# Patient Record
Sex: FEMALE | Race: WHITE | NOT HISPANIC OR LATINO | URBAN - METROPOLITAN AREA
[De-identification: names, ages, dates, MRNs, and addresses within clinical notes are randomized per-mention and may not be internally consistent; named-entity substitution may affect disease eponyms.]

---

## 2017-03-28 ENCOUNTER — RECORDS - HEALTHEAST (OUTPATIENT)
Dept: LAB | Facility: CLINIC | Age: 27
End: 2017-03-28

## 2017-03-28 LAB
HIV 1+2 AB+HIV1 P24 AG SERPL QL IA: NEGATIVE
HIV 1+2 AB+HIV1 P24 AG SERPL QL IA: NORMAL

## 2017-03-29 LAB
HBV SURFACE AG SERPL QL IA: NEGATIVE
HBV SURFACE AG SERPL QL IA: NORMAL
RUBELLA ANTIBODY IGG QUANTITATIVE: NORMAL IU/ML
SYPHILIS RPR SCREEN - HISTORICAL: NORMAL

## 2017-04-03 LAB
C TRACH DNA SPEC QL PROBE+SIG AMP: NORMAL
N GONORRHOEA DNA SPEC QL PROBE+SIG AMP: NORMAL

## 2017-04-05 ENCOUNTER — RECORDS - HEALTHEAST (OUTPATIENT)
Dept: ADMINISTRATIVE | Facility: OTHER | Age: 27
End: 2017-04-05

## 2017-04-05 LAB
BKR LAB AP ABNORMAL BLEEDING: NO
BKR LAB AP BIRTH CONTROL/HORMONES: NORMAL
BKR LAB AP CERVICAL APPEARANCE: NORMAL
BKR LAB AP GYN ADEQUACY: NORMAL
BKR LAB AP GYN INTERPRETATION: NORMAL
BKR LAB AP HPV REFLEX: NORMAL
BKR LAB AP LMP: NORMAL
BKR LAB AP PATIENT STATUS: NORMAL
BKR LAB AP PREVIOUS ABNORMAL: NORMAL
BKR LAB AP PREVIOUS NORMAL: NORMAL
GLU GEST SCREEN 1HR 50G: 97
HIGH RISK?: NO
PATH REPORT.COMMENTS IMP SPEC: NORMAL
RESULT FLAG (HE HISTORICAL CONVERSION): NORMAL

## 2017-07-20 ENCOUNTER — AMBULATORY - HEALTHEAST (OUTPATIENT)
Dept: SCHEDULING | Facility: CLINIC | Age: 27
End: 2017-07-20

## 2017-07-20 ENCOUNTER — RECORDS - HEALTHEAST (OUTPATIENT)
Dept: LAB | Facility: CLINIC | Age: 27
End: 2017-07-20

## 2017-07-20 DIAGNOSIS — O36.5990 IUGR (INTRAUTERINE GROWTH RESTRICTION) AFFECTING CARE OF MOTHER: ICD-10-CM

## 2017-07-20 LAB — GLU GEST SCREEN 1HR 50G: 114

## 2017-07-21 ENCOUNTER — AMBULATORY - HEALTHEAST (OUTPATIENT)
Dept: SCHEDULING | Facility: CLINIC | Age: 27
End: 2017-07-21

## 2017-07-21 DIAGNOSIS — O36.5990 IUGR (INTRAUTERINE GROWTH RESTRICTION) AFFECTING CARE OF MOTHER: ICD-10-CM

## 2017-07-21 LAB
SYPHILIS RPR SCREEN - HISTORICAL: NORMAL
T PALLIDUM IGG SER QL: NON REACTIVE

## 2017-07-25 ENCOUNTER — HOSPITAL ENCOUNTER (OUTPATIENT)
Dept: MEDSURG UNIT | Facility: CLINIC | Age: 27
Discharge: HOME | End: 2017-07-25
Attending: FAMILY MEDICINE | Admitting: FAMILY MEDICINE

## 2017-07-25 ENCOUNTER — HOSPITAL ENCOUNTER (OUTPATIENT)
Dept: ULTRASOUND IMAGING | Facility: CLINIC | Age: 27
Discharge: HOME | End: 2017-07-25
Attending: FAMILY MEDICINE

## 2017-07-25 DIAGNOSIS — O36.5990 IUGR (INTRAUTERINE GROWTH RESTRICTION) AFFECTING CARE OF MOTHER: ICD-10-CM

## 2017-07-28 ENCOUNTER — HOSPITAL ENCOUNTER (OUTPATIENT)
Dept: OBGYN | Facility: HOSPITAL | Age: 27
Discharge: HOME | End: 2017-07-28
Attending: OBSTETRICS & GYNECOLOGY | Admitting: FAMILY MEDICINE

## 2017-08-18 ENCOUNTER — HOSPITAL ENCOUNTER (OUTPATIENT)
Dept: OBGYN | Facility: HOSPITAL | Age: 27
Discharge: HOME | End: 2017-08-19
Attending: OBSTETRICS & GYNECOLOGY | Admitting: FAMILY MEDICINE

## 2017-08-18 ASSESSMENT — MIFFLIN-ST. JEOR: SCORE: 1493.79

## 2017-08-22 ENCOUNTER — HOSPITAL ENCOUNTER (OUTPATIENT)
Dept: ULTRASOUND IMAGING | Facility: CLINIC | Age: 27
End: 2017-08-22
Attending: OBSTETRICS & GYNECOLOGY
Payer: COMMERCIAL

## 2017-08-22 ENCOUNTER — HOSPITAL ENCOUNTER (INPATIENT)
Facility: CLINIC | Age: 27
LOS: 3 days | Discharge: HOME HEALTH | End: 2017-08-26
Attending: OBSTETRICS & GYNECOLOGY | Admitting: OBSTETRICS & GYNECOLOGY
Payer: COMMERCIAL

## 2017-08-22 DIAGNOSIS — Z98.891 S/P CESAREAN SECTION: Primary | ICD-10-CM

## 2017-08-22 PROBLEM — O36.5990 IUGR (INTRAUTERINE GROWTH RESTRICTION) AFFECTING CARE OF MOTHER: Status: ACTIVE | Noted: 2017-08-22

## 2017-08-22 PROBLEM — Z36.89 ENCOUNTER FOR TRIAGE IN PREGNANT PATIENT: Status: ACTIVE | Noted: 2017-08-22

## 2017-08-22 LAB
ABO + RH BLD: NORMAL
ABO + RH BLD: NORMAL
BLD GP AB SCN SERPL QL: NORMAL
BLOOD BANK CMNT PATIENT-IMP: NORMAL
ERYTHROCYTE [DISTWIDTH] IN BLOOD BY AUTOMATED COUNT: 13.7 % (ref 10–15)
HCT VFR BLD AUTO: 35.3 % (ref 35–47)
HGB BLD-MCNC: 11.9 G/DL (ref 11.7–15.7)
MCH RBC QN AUTO: 30.9 PG (ref 26.5–33)
MCHC RBC AUTO-ENTMCNC: 33.7 G/DL (ref 31.5–36.5)
MCV RBC AUTO: 92 FL (ref 78–100)
PLATELET # BLD AUTO: 219 10E9/L (ref 150–450)
RBC # BLD AUTO: 3.85 10E12/L (ref 3.8–5.2)
SPECIMEN EXP DATE BLD: NORMAL
WBC # BLD AUTO: 9.4 10E9/L (ref 4–11)

## 2017-08-22 PROCEDURE — 76811 OB US DETAILED SNGL FETUS: CPT

## 2017-08-22 PROCEDURE — 96361 HYDRATE IV INFUSION ADD-ON: CPT

## 2017-08-22 PROCEDURE — G0378 HOSPITAL OBSERVATION PER HR: HCPCS

## 2017-08-22 PROCEDURE — 86780 TREPONEMA PALLIDUM: CPT | Performed by: OBSTETRICS & GYNECOLOGY

## 2017-08-22 PROCEDURE — 86900 BLOOD TYPING SEROLOGIC ABO: CPT | Performed by: OBSTETRICS & GYNECOLOGY

## 2017-08-22 PROCEDURE — 99214 OFFICE O/P EST MOD 30 MIN: CPT | Mod: 25

## 2017-08-22 PROCEDURE — 76819 FETAL BIOPHYS PROFIL W/O NST: CPT | Performed by: OBSTETRICS & GYNECOLOGY

## 2017-08-22 PROCEDURE — 85027 COMPLETE CBC AUTOMATED: CPT | Performed by: OBSTETRICS & GYNECOLOGY

## 2017-08-22 PROCEDURE — 25000128 H RX IP 250 OP 636: Performed by: OBSTETRICS & GYNECOLOGY

## 2017-08-22 PROCEDURE — 96372 THER/PROPH/DIAG INJ SC/IM: CPT

## 2017-08-22 PROCEDURE — 86850 RBC ANTIBODY SCREEN: CPT | Performed by: OBSTETRICS & GYNECOLOGY

## 2017-08-22 PROCEDURE — 25000132 ZZH RX MED GY IP 250 OP 250 PS 637: Performed by: OBSTETRICS & GYNECOLOGY

## 2017-08-22 PROCEDURE — 96366 THER/PROPH/DIAG IV INF ADDON: CPT

## 2017-08-22 PROCEDURE — 36415 COLL VENOUS BLD VENIPUNCTURE: CPT | Performed by: OBSTETRICS & GYNECOLOGY

## 2017-08-22 PROCEDURE — 96360 HYDRATION IV INFUSION INIT: CPT

## 2017-08-22 PROCEDURE — 86901 BLOOD TYPING SEROLOGIC RH(D): CPT | Performed by: OBSTETRICS & GYNECOLOGY

## 2017-08-22 PROCEDURE — 96365 THER/PROPH/DIAG IV INF INIT: CPT

## 2017-08-22 RX ORDER — OLANZAPINE 5 MG/1
7.5 TABLET ORAL
Status: ON HOLD | COMMUNITY
End: 2017-08-26

## 2017-08-22 RX ORDER — CYANOCOBALAMIN 1000 UG/ML
1 INJECTION, SOLUTION INTRAMUSCULAR; SUBCUTANEOUS
COMMUNITY

## 2017-08-22 RX ORDER — BETAMETHASONE SODIUM PHOSPHATE AND BETAMETHASONE ACETATE 3; 3 MG/ML; MG/ML
12 INJECTION, SUSPENSION INTRA-ARTICULAR; INTRALESIONAL; INTRAMUSCULAR; SOFT TISSUE EVERY 24 HOURS
Status: DISCONTINUED | OUTPATIENT
Start: 2017-08-22 | End: 2017-08-23

## 2017-08-22 RX ORDER — SODIUM CHLORIDE, SODIUM LACTATE, POTASSIUM CHLORIDE, CALCIUM CHLORIDE 600; 310; 30; 20 MG/100ML; MG/100ML; MG/100ML; MG/100ML
INJECTION, SOLUTION INTRAVENOUS CONTINUOUS
Status: DISCONTINUED | OUTPATIENT
Start: 2017-08-22 | End: 2017-08-23

## 2017-08-22 RX ORDER — PRENATAL VIT/IRON FUM/FOLIC AC 27MG-0.8MG
1 TABLET ORAL DAILY
COMMUNITY

## 2017-08-22 RX ORDER — OLANZAPINE 7.5 MG/1
7.5 TABLET, FILM COATED ORAL AT BEDTIME
Status: DISCONTINUED | OUTPATIENT
Start: 2017-08-22 | End: 2017-08-25

## 2017-08-22 RX ORDER — HYDROXYZINE HYDROCHLORIDE 25 MG/1
25 TABLET, FILM COATED ORAL AT BEDTIME
Status: DISCONTINUED | OUTPATIENT
Start: 2017-08-22 | End: 2017-08-23

## 2017-08-22 RX ORDER — LIDOCAINE 40 MG/G
CREAM TOPICAL
Status: DISCONTINUED | OUTPATIENT
Start: 2017-08-22 | End: 2017-08-23

## 2017-08-22 RX ORDER — HYDROXYZINE HCL 10 MG/5 ML
25 SOLUTION, ORAL ORAL DAILY
Status: ON HOLD | COMMUNITY
End: 2017-08-26

## 2017-08-22 RX ADMIN — SODIUM CHLORIDE, POTASSIUM CHLORIDE, SODIUM LACTATE AND CALCIUM CHLORIDE: 600; 310; 30; 20 INJECTION, SOLUTION INTRAVENOUS at 17:05

## 2017-08-22 RX ADMIN — HYDROXYZINE HYDROCHLORIDE 25 MG: 25 TABLET ORAL at 22:11

## 2017-08-22 RX ADMIN — BETAMETHASONE SODIUM PHOSPHATE AND BETAMETHASONE ACETATE 12 MG: 3; 3 INJECTION, SUSPENSION INTRA-ARTICULAR; INTRALESIONAL; INTRAMUSCULAR at 19:52

## 2017-08-22 RX ADMIN — SODIUM CHLORIDE, POTASSIUM CHLORIDE, SODIUM LACTATE AND CALCIUM CHLORIDE 1000 ML: 600; 310; 30; 20 INJECTION, SOLUTION INTRAVENOUS at 16:09

## 2017-08-22 RX ADMIN — OLANZAPINE 7.5 MG: 7.5 TABLET, FILM COATED ORAL at 22:11

## 2017-08-22 NOTE — IP AVS SNAPSHOT
MRN:[id number]                      After Visit Summary   2017    Jazz Lal    MRN: [id number]           Thank you!     Thank you for choosing [facility] for your care. Our goal is always to provide you with excellent care. Hearing back from our patients is one way we can continue to improve our services. Please take a few minutes to complete the written survey that you may receive in the mail after you visit with us. Thank you!        Patient Information     Date Of Birth          1990        Designated Caregiver       Most Recent Value    Caregiver    Will someone help with your care after discharge? no      About your hospital stay     You were admitted on:  2017 You last received care in the:  [facility]    You were discharged on:  2017       Who to Call     For medical emergencies, please call 911.  For non-urgent questions about your medical care, please call your primary care provider or clinic, [phone]  For questions related to your surgery, please call your surgery clinic        Attending Provider     Provider Laura Au,  OB/Gyn       Primary Care Provider Office Phone # Fax #    Jazz Maddox [phone]      After Care Instructions     Activity       Review discharge instructions            Diet       Resume previous diet            Discharge Instructions - Gestational diabetic patients       Gestational diabetic patients to follow up for fasting blood sugar and 2 hour 75gm glucose load at 6 weeks postpartum.            Discharge Instructions - Postpartum visit       Schedule postpartum visit with your provider and return to clinic in 6 weeks and in 1 week for mood check.      Activity Instructions:   -  delivery: No lifting more than 15 pounds for 6 weeks.  Nothing in the vagina for 6 weeks, no intercourse for 6 weeks. No strenuous exercise for 6 weeks. No driving until you have stopped taking your pain  medications.      Call your health care provider if you have any of the following: Fever above 100.4 F; opening or drainage from your incision; soaking a sanitary pad with blood within 1 hour, or you see blood clots larger than a golf ball; malodorous vaginal discharge, severe or worsening pain uncontrolled by your pain medications, nausea and vomiting, severe headaches, changes in vision, calf swelling or pain, shortness of breath, problems coping with sadness, anxiety, or depression.  If you have any concerns about hurting yourself or the baby, call your provider immediately. You are encouraged to call with questions or concerns after you return home.                  Further instructions from your care team       Postop  Birth Instructions    Activity       Do not lift more than 10 pounds for 6 weeks after surgery.  Ask family and friends for help when you need it.    No driving until you have stopped taking your pain medications (usually two weeks after surgery).    No heavy exercise or activity for 6 weeks.  Don't do anything that will put a strain on your surgery site.    Don't strain when using the toilet.  Your care team may prescribe a stool softener if you have problems with your bowel movements.     To care for your incision:       Keep the incision clean and dry.    Do not soak your incision in water. No swimming or hot tubs until it has fully healed. You may soak in the bathtub if the water level is below your incision.    Do not use peroxide, gel, cream, lotion, or ointment on your incision.    Adjust your clothes to avoid pressure on your surgery site (check the elastic in your underwear for example).     You may see a small amount of clear or pink drainage and this is normal.  Check with your health care provider:       If the drainage increases or has an odor.    If the incision reddens, you have swelling, or develop a rash.    If you have increased pain and the medicine we prescribed  doesn't help.    If you have a fever above 100.4 F (38 C) with or without chills when placing thermometer under your tongue.   The area around your incision (surgery wound), will feel numb.  This is normal. The numbness should go away in less than a year.     Keep your hands clean:  Always wash your hands before touching your incision (surgery wound). This helps reduce your risk of infection. If your hands aren't dirty, you may use an alcohol hand-rub to clean your hands. Keep your nails clean and short.    Call your healthcare provider if you have any of these symptoms:       You soak a sanitary pad with blood within 1 hour, or you see blood clots larger than a golf ball.    Bleeding that lasts more than 6 weeks.    Vaginal discharge that smells bad.    Severe pain, cramping or tenderness in your lower belly area.    A need to urinate more frequently (use the toilet more often), more urgently (use the toilet very quickly), or it burns when you urinate.    Nausea and vomiting.    Redness, swelling or pain around a vein in your leg.    Problems breastfeeding or a red or painful area on your breast.    Chest pain and cough or are gasping for air.    Problems with coping with sadness, anxiety or depression. If you have concerns about hurting yourself or the baby, call your provider immediately.      You have questions or concerns after you return home.                  Pending Results     Date and Time Order Name Status Description    8/24/2017 0939 Toxoplasma Preg Panel >16 wk gest In process             Statement of Approval     Ordered          08/26/17 0200  I have reviewed and agree with all the recommendations and orders detailed in this document.  EFFECTIVE NOW     Approved and electronically signed by:  Laura Samano DO             Admission Information     Date & Time Provider Department Dept. Phone    8/22/2017 Laura Samano DO [facility] [phone]      Your Vitals Were     Blood Pressure  "Temperature Respirations Height Weight Pulse Oximetry    123/71 97.8  F (36.6  C) (Oral) 16 1.626 m (5' 4\") 80.3 kg (177 lb) 97%    BMI (Body Mass Index)                   30.38 kg/m2           [employer] Information     [employer] lets you send messages to your doctor, view your test results, renew your prescriptions, schedule appointments and more. To sign up, go to [email or web address] . Click on \"Log in\" on the left side of the screen, which will take you to the Welcome page. Then click on \"Sign up Now\" on the right side of the page.     You will be asked to enter the access code listed below, as well as some personal information. Please follow the directions to create your username and password.     Your access code is: [email or web address]  Expires: 2017 12:04 PM     Your access code will  in 90 days. If you need help or a new code, please call your [facility] clinic or [phone].        Care EveryWhere ID     This is your Care EveryWhere ID. This could be used by other organizations to access your [facility] medical records  [id number]        Equal Access to Services     SIRI EISENBERG AH: Terrence Dias, wawalterda brenden, qaybta kaalmada adekallieda, lupe valencia. So [facility] [phone].    ATENCIÓN: Si habla español, tiene a underwood disposición servicios gratuitos de asistencia lingüística. Llame al [phone].    We comply with applicable federal civil rights laws and [place] laws. We do not discriminate on the basis of race, color, national origin, age, disability sex, sexual orientation or gender identity.               Review of your medicines      START taking        Dose / Directions    divalproex 500 MG 24 hr tablet   Commonly known as:  DEPAKOTE ER        Dose:  500 mg   Take 1 tablet (500 mg) by mouth At Bedtime   Quantity:  30 tablet   Refills:  1       ibuprofen 600 MG tablet   Commonly known as:  ADVIL/MOTRIN        Dose:  600 mg   Take 1 tablet (600 mg) by " mouth every 6 hours as needed for other (cramping)   Quantity:  30 tablet   Refills:  0       oxyCODONE-acetaminophen 5-325 MG per tablet   Commonly known as:  PERCOCET        Dose:  1-2 tablet   Take 1-2 tablets by mouth every 4 hours as needed for pain maximum 8 tablet(s) per day   Quantity:  30 tablet   Refills:  0       senna-docusate 8.6-50 MG per tablet   Commonly known as:  SENOKOT-S;PERICOLACE        Dose:  1-2 tablet   Take 1-2 tablets by mouth 2 times daily as needed for constipation   Quantity:  60 tablet   Refills:  1         CONTINUE these medicines which may have CHANGED, or have new prescriptions. If we are uncertain of the size of tablets/capsules you have at home, strength may be listed as something that might have changed.        Dose / Directions    OLANZapine 10 MG tablet   Commonly known as:  zyPREXA   This may have changed:    - medication strength  - how much to take  - when to take this        Dose:  10 mg   Take 1 tablet (10 mg) by mouth At Bedtime   Quantity:  30 tablet   Refills:  1         CONTINUE these medicines which have NOT CHANGED        Dose / Directions    cyanocobalamin 1000 MCG/ML injection   Commonly known as:  VITAMIN B12        Dose:  1 mL   Inject 1 mL into the muscle every 30 days   Refills:  0       prenatal multivitamin plus iron 27-0.8 MG Tabs per tablet        Dose:  1 tablet   Take 1 tablet by mouth daily   Refills:  0         STOP taking     hydrOXYzine 10 MG/5ML syrup   Commonly known as:  ATARAX           ZOFRAN PO                Where to get your medicines      These medications were sent to [facility] [place], [place] - [address]  [address] [address]     Phone:  [phone]     divalproex 500 MG 24 hr tablet    ibuprofen 600 MG tablet    OLANZapine 10 MG tablet    senna-docusate 8.6-50 MG per tablet         Some of these will need a paper prescription and others can be bought over the counter. Ask your nurse if you  have questions.     Bring a paper prescription for each of these medications     oxyCODONE-acetaminophen 5-325 MG per tablet                Protect others around you: Learn how to safely use, store and throw away your medicines at www.disposemymeds.org.             Medication List: This is a list of all your medications and when to take them. Check marks below indicate your daily home schedule. Keep this list as a reference.      Medications           Morning Afternoon Evening Bedtime As Needed    cyanocobalamin 1000 MCG/ML injection   Commonly known as:  VITAMIN B12   Inject 1 mL into the muscle every 30 days                                divalproex 500 MG 24 hr tablet   Commonly known as:  DEPAKOTE ER   Take 1 tablet (500 mg) by mouth At Bedtime   Last time this was given:  500 mg on 8/25/2017 11:41 PM                                ibuprofen 600 MG tablet   Commonly known as:  ADVIL/MOTRIN   Take 1 tablet (600 mg) by mouth every 6 hours as needed for other (cramping)   Last time this was given:  800 mg on 8/26/2017  4:47 AM                                OLANZapine 10 MG tablet   Commonly known as:  zyPREXA   Take 1 tablet (10 mg) by mouth At Bedtime   Last time this was given:  10 mg on 8/25/2017 11:41 PM                                oxyCODONE-acetaminophen 5-325 MG per tablet   Commonly known as:  PERCOCET   Take 1-2 tablets by mouth every 4 hours as needed for pain maximum 8 tablet(s) per day                                prenatal multivitamin plus iron 27-0.8 MG Tabs per tablet   Take 1 tablet by mouth daily                                senna-docusate 8.6-50 MG per tablet   Commonly known as:  SENOKOT-S;PERICOLACE   Take 1-2 tablets by mouth 2 times daily as needed for constipation   Last time this was given:  2 tablets on 8/26/2017  8:58 AM

## 2017-08-22 NOTE — H&P
[facility]  OB History and Physical      Jazz Lal MRN# [id number]   Age: 26 year old YOB: 1990     CC: Abnormal heart on outside ultrasound    HPI:  Ms. Jazz Lal is a 26 year old  at 35w2d by 6w3d US, who is presents via ambulance from outside clinic with concerns for an abnormal heart noted during her BPP.  Her pregnancy has been complicated by symmetric fetal growth restriction and an absent left kidney noted at her L2 ultrasound with MPP.  They recommended serial growth ultrasounds and BPPs/UARs which she has been receiving with [facility].  On review of her records she had multiple evaluations at [place] for non-reactive NSTs/mimial variability/intermittent decelerations and received a course of betamethasone x 2 3 weeks ago.  UARs and BPPs have been normal except for on episode of SD elevation on 17.  Has not had any genetic screening or testing performed.      She states that she has been feeling well without contractions, vaginal bleeding, loss of fluid and reports good fetal movement.  She has been doing nightly kick counts all of which have been normal.      Pregnancy Complications:  - Non-reactive NST, today and previously.   - Absent/atrophic left kidney - absent on earlier US  - Symmetrical IUGR - see US below  - History of bipolar disorder - currently on Zyprexa. Was on Depakote for 1st month of pregnancy which she discontinued. Patient's parents have custody rights to her first child.     Outside ultrasounds  17: 25w2d US, EFW 663g, 8%tile, IUGR, nl JAYCE, nl UA waveform  17: 30w2d US, EFW 1197g, <3%ile, severe symmetric IUGR,  absent L kidney, nl JAYCE, nl UA waveform  17:  Estimated Fetal Weight: 1480 +/- 216 g, EFW Percentile: 28%, UAR SD ratio slightly elevated given gestational age.  3.6-4.5 (normal <4.0 for gestational age).     17: Total biophysical profile . Umbilical Arterial Doppler: 3.3. JAYCE 7.2.   17  Total biophysical profile . The kidneys appear asymmetric with the left kidney appearing smaller than the right. Cannot exclude left renal atrophy. No hydronephrosis is seen. JAYCE 8.82.     OB History: , previous  at 40w GA, 7lbs 2oz  Obstetric History       T1      L1     SAB0   TAB0   Ectopic0   Multiple0   Live Births1       # Outcome Date GA Lbr [place]/2nd Weight Sex Delivery Anes PTL Lv   2 Current            1 Term 10/05/14 41w0d  3.232 kg (7 lb 2 oz) F   N IDALIA      Obstetric Comments   [facility].  Parents have custody.       PMHx: Bipolar disorder  Past Medical History:   Diagnosis Date     Mental disorder     Bipolar disorder- currently taking zyprexa- will take depakote after delivery     PSHx: [place] teeth extraction    Meds:   Prescriptions Prior to Admission   Medication Sig Dispense Refill Last Dose     OLANZapine (ZYPREXA) 5 MG tablet Take 7.5 mg by mouth   2017 at Unknown time     hydrOXYzine (ATARAX) 10 MG/5ML syrup Take 25 mg by mouth daily   2017 at Unknown time     Ondansetron HCl (ZOFRAN PO) Take by mouth as needed for nausea or vomiting   2017 at Unknown time     Prenatal Vit-Fe Fumarate-FA (PRENATAL MULTIVITAMIN PLUS IRON) 27-0.8 MG TABS per tablet Take 1 tablet by mouth daily   2017 at Unknown time     cyanocobalamin (VITAMIN B12) 1000 MCG/ML injection Inject 1 mL into the muscle every 30 days   2017 at Unknown time     Allergies:    Allergies   Allergen Reactions     Bees Anaphylaxis and Hives      FmHx: Noncontributory  SocHx: She denies any tobacco, alcohol, or other drug use during this pregnancy.    ROS:   Complete 10-point ROS negative except as noted in HPI. She denies headache, blurry vision, chest pain, shortness of breath, RUQ pain, nausea, vomiting, dysuria, hematuria or extremity edema.    PE:  Vit:   Patient Vitals for the past 4 hrs:   BP   17 0756 110/77      Gen: Well-appearing, NAD, comfortable   CV: rrr, no mrg  "  Pulm: Ctab, no wheezes or crackles   Abd: Soft, gravid, non-tender, +BS   Ext: LE edema b/l    FHT: Baseline 140, minimal variability, absent accelerations, absent decelerations   [place]: No contractions in 10 minutes        Please see \"Imaging\" tab under \"Chart Review\" for details of today's US.    17 US (on admission):   1) Intrauterine pregnancy at 35+2 weeks gestational age.  2) Concerns for possible hypoplastic left heart as LV<RV, narrowed LVOT, significant tricuspid regurgitation, pericardial effusion. [place] kidney. None of the other anomalies commonly  detected by ultrasound were evident in the limited fetal anatomic survey described in full report.  3) Growth parameters and estimated fetal weight were consistent with fetal growth restriction with AC at the 3%tile, HC at the 1%tile, not consistent with microcepahly. EFW 10%tile.  4) The amniotic fluid volume appeared normal.  5) Normal umbilical artery doppler.  6) Reassuring BPP.    Assessment  Ms. Jazz Lal is a 26 year old  at 35w2d by 6w3d US with fetal growth restriction and new finding of congenital heart disease.    #) Fetal Well Being: ultrasound concerning for possible hypoplastic left heart or right sided lesion and fetal growth restriction.     US significant for narrowed LVOT, tricuspid regurg, and pericardial effusion. No polyhydramnios or fetal edema. Growth restriction with AC at the 3%tile, HC at the 1%tile. EFW 10%tile.   Discussed with patient possible etiologies for current presentation, including FGR secondary to placental insufficiency, associated with cardiac anomalies, or possibly chromosomal abnormalities.  We discussed our concerns for underlying genetic causes given the early onset of symmetrical growth restriction.  Options, risks and benefits of NIPT screening, amniocentesis and cord bloods for karyotype/CGH, TORCH titers (given FGR, pericardial effusion) were discussed.  We will have her meet with our genetic " counselor in am.  We discussed this information would be important to determine  prognosis and options/candidacy for surgery.             - FHT with minimal to moderate variability and non-reactivity. This is consistent with previous NSTs she has had during pregnancy and may be secondary to underlying genetic or cardiac abnormalities as BPPs have always been reassuring.  - Will continue EFM and [place] monitoring overnight with repeat BPP in am.  Delivery would be indicated for prolonged or repetitive decelerations.      - Rescue course of betamethasone to be given this evening.    - Will arrange NICU and social work consultation in am after fetal ECHO.  - Consented for  delivery    #) Prenatal care  - AB+/Ab neg, RI/RPR neg/Hep B neg/HIV neg  - GCT: 114  - GBS: unknown    #) Bipolar disorder  - Continue home Zyprexa  - UDS    I, Consuelo Reagan, MS4 am serving as a scribe to document services personally performed by Laura Samano DO, based upon my observations and the provider's statements to me. All documentation has been reviewed by the aforementioned doctor prior to being entered into the official medical record.     Consuelo Reagan  OB/GYN MS4  17 4:43 PM       Late entry:  Attending Attestation:  The documentation recorded by the scribe accurately reflects the services I personally performed and the decisions made by me.  We discussed that we will continue to monitor her overnight with plans for fetal ECHO in am.  Given baseline non-reactive fetal strip throughout pregnancy this is unlikely secondary to fetal hypoxia and related to underlying fetal cardiac disease/genetic abnormalities, particularly in the setting of reassuring BPPs and delivery is not indicated at this time, unless repetitive or prolonged decelerations are seen.  Will give rescue course of betamethasone during this admission.           I spent 45 minutes total face-to-face with this inpatient, and >50% of the time was spent  in counseling and/or coordination of care.    Laura Samano, DO  Maternal Fetal Medicine

## 2017-08-22 NOTE — PROVIDER NOTIFICATION
08/22/17 1540   Provider Notification   Provider Name/Title Dr Florez   Method of Notification In Department   Notification Reason Status Update   Pt arrived via ambulance, placed on EFM monitor. Reporting history to Med student. Dr Florez ordered IV started and bolus of LR. Shortly after IV placed and bolus started pt brought to ultrasound at 1610.

## 2017-08-22 NOTE — IP AVS SNAPSHOT
[facility]    [address] [facility] [address] [facility] [address]    Phone:  [phone]                                       After Visit Summary   8/22/2017    Jazz Lal    MRN: [id number]           After Visit Summary Signature Page     I have received my discharge instructions, and my questions have been answered. I have discussed any challenges I see with this plan with the nurse or doctor.    ..........................................................................................................................................  Patient/Patient Representative Signature      ..........................................................................................................................................  Patient Representative Print Name and Relationship to Patient    ..................................................               ................................................  Date                                            Time    ..........................................................................................................................................  Reviewed by Signature/Title    ...................................................              ..............................................  Date                                                            Time

## 2017-08-23 ENCOUNTER — SURGERY (OUTPATIENT)
Age: 27
End: 2017-08-23

## 2017-08-23 ENCOUNTER — APPOINTMENT (OUTPATIENT)
Dept: CARDIOLOGY | Facility: CLINIC | Age: 27
End: 2017-08-23
Attending: OBSTETRICS & GYNECOLOGY
Payer: COMMERCIAL

## 2017-08-23 ENCOUNTER — PRE VISIT (OUTPATIENT)
Dept: MATERNAL FETAL MEDICINE | Facility: CLINIC | Age: 27
End: 2017-08-23

## 2017-08-23 ENCOUNTER — HOSPITAL ENCOUNTER (OUTPATIENT)
Dept: ULTRASOUND IMAGING | Facility: CLINIC | Age: 27
Setting detail: OBSERVATION
End: 2017-08-23
Attending: OBSTETRICS & GYNECOLOGY
Payer: COMMERCIAL

## 2017-08-23 ENCOUNTER — ANESTHESIA EVENT (OUTPATIENT)
Dept: OBGYN | Facility: CLINIC | Age: 27
End: 2017-08-23
Payer: COMMERCIAL

## 2017-08-23 ENCOUNTER — ANESTHESIA (OUTPATIENT)
Dept: OBGYN | Facility: CLINIC | Age: 27
End: 2017-08-23
Payer: COMMERCIAL

## 2017-08-23 PROBLEM — Z98.891 S/P CESAREAN SECTION: Status: ACTIVE | Noted: 2017-08-23

## 2017-08-23 PROBLEM — O36.8390 NON-REASSURING FETAL HEART RATE OR RHYTHM AFFECTING MANAGEMENT OF MOTHER: Status: ACTIVE | Noted: 2017-08-23

## 2017-08-23 LAB
AMPHETAMINES UR QL SCN>1000 NG/ML: NEGATIVE
BARBITURATES UR QL SCN: NEGATIVE
BENZODIAZ UR QL SCN: NEGATIVE
BZE UR QL SCN>300 NG/ML: NEGATIVE
CARBOXYTHC UR QL SCN: NEGATIVE
CREAT UR-MCNC: 18 MG/DL
ETHANOL UR QL: NEGATIVE
METHADONE UR QL SCN: NEGATIVE
OPIATES UR QL SCN: NEGATIVE
OXYCODONE UR QL SCN: NEGATIVE
PCP CTO UR SCN-MCNC: NEGATIVE NG/ML
T PALLIDUM IGG+IGM SER QL: NEGATIVE

## 2017-08-23 PROCEDURE — 96361 HYDRATE IV INFUSION ADD-ON: CPT

## 2017-08-23 PROCEDURE — 27110028 ZZH OR GENERAL SUPPLY NON-STERILE: Performed by: OBSTETRICS & GYNECOLOGY

## 2017-08-23 PROCEDURE — 36000059 ZZH SURGERY LEVEL 3 EA 15 ADDTL MIN UMMC: Performed by: OBSTETRICS & GYNECOLOGY

## 2017-08-23 PROCEDURE — 36000057 ZZH SURGERY LEVEL 3 1ST 30 MIN - UMMC: Performed by: OBSTETRICS & GYNECOLOGY

## 2017-08-23 PROCEDURE — 25000128 H RX IP 250 OP 636: Performed by: NURSE ANESTHETIST, CERTIFIED REGISTERED

## 2017-08-23 PROCEDURE — 25000128 H RX IP 250 OP 636: Performed by: OBSTETRICS & GYNECOLOGY

## 2017-08-23 PROCEDURE — 25000132 ZZH RX MED GY IP 250 OP 250 PS 637: Performed by: OBSTETRICS & GYNECOLOGY

## 2017-08-23 PROCEDURE — 76819 FETAL BIOPHYS PROFIL W/O NST: CPT | Performed by: OBSTETRICS & GYNECOLOGY

## 2017-08-23 PROCEDURE — G0378 HOSPITAL OBSERVATION PER HR: HCPCS

## 2017-08-23 PROCEDURE — C9290 INJ, BUPIVACAINE LIPOSOME: HCPCS | Performed by: STUDENT IN AN ORGANIZED HEALTH CARE EDUCATION/TRAINING PROGRAM

## 2017-08-23 PROCEDURE — 25000125 ZZHC RX 250: Performed by: OBSTETRICS & GYNECOLOGY

## 2017-08-23 PROCEDURE — 12000030 ZZH R&B OB INTERMEDIATE UMMC

## 2017-08-23 PROCEDURE — 71000014 ZZH RECOVERY PHASE 1 LEVEL 2 FIRST HR: Performed by: OBSTETRICS & GYNECOLOGY

## 2017-08-23 PROCEDURE — 25000125 ZZHC RX 250: Performed by: STUDENT IN AN ORGANIZED HEALTH CARE EDUCATION/TRAINING PROGRAM

## 2017-08-23 PROCEDURE — 88307 TISSUE EXAM BY PATHOLOGIST: CPT | Performed by: OBSTETRICS & GYNECOLOGY

## 2017-08-23 PROCEDURE — 27210794 ZZH OR GENERAL SUPPLY STERILE: Performed by: OBSTETRICS & GYNECOLOGY

## 2017-08-23 PROCEDURE — 76825 ECHO EXAM OF FETAL HEART: CPT

## 2017-08-23 PROCEDURE — 80307 DRUG TEST PRSMV CHEM ANLYZR: CPT | Performed by: OBSTETRICS & GYNECOLOGY

## 2017-08-23 PROCEDURE — 25000128 H RX IP 250 OP 636: Performed by: STUDENT IN AN ORGANIZED HEALTH CARE EDUCATION/TRAINING PROGRAM

## 2017-08-23 PROCEDURE — 71000015 ZZH RECOVERY PHASE 1 LEVEL 2 EA ADDTL HR: Performed by: OBSTETRICS & GYNECOLOGY

## 2017-08-23 PROCEDURE — 25000132 ZZH RX MED GY IP 250 OP 250 PS 637

## 2017-08-23 PROCEDURE — 40000170 ZZH STATISTIC PRE-PROCEDURE ASSESSMENT II: Performed by: OBSTETRICS & GYNECOLOGY

## 2017-08-23 PROCEDURE — 76816 OB US FOLLOW-UP PER FETUS: CPT

## 2017-08-23 PROCEDURE — 37000009 ZZH ANESTHESIA TECHNICAL FEE, EACH ADDTL 15 MIN: Performed by: OBSTETRICS & GYNECOLOGY

## 2017-08-23 PROCEDURE — 88307 TISSUE EXAM BY PATHOLOGIST: CPT | Mod: 26 | Performed by: OBSTETRICS & GYNECOLOGY

## 2017-08-23 PROCEDURE — 25000125 ZZHC RX 250: Performed by: NURSE ANESTHETIST, CERTIFIED REGISTERED

## 2017-08-23 PROCEDURE — 37000008 ZZH ANESTHESIA TECHNICAL FEE, 1ST 30 MIN: Performed by: OBSTETRICS & GYNECOLOGY

## 2017-08-23 RX ORDER — LIDOCAINE 40 MG/G
CREAM TOPICAL
Status: DISCONTINUED | OUTPATIENT
Start: 2017-08-23 | End: 2017-08-26 | Stop reason: HOSPADM

## 2017-08-23 RX ORDER — KETOROLAC TROMETHAMINE 30 MG/ML
INJECTION, SOLUTION INTRAMUSCULAR; INTRAVENOUS PRN
Status: DISCONTINUED | OUTPATIENT
Start: 2017-08-23 | End: 2017-08-23

## 2017-08-23 RX ORDER — SODIUM CHLORIDE, SODIUM LACTATE, POTASSIUM CHLORIDE, CALCIUM CHLORIDE 600; 310; 30; 20 MG/100ML; MG/100ML; MG/100ML; MG/100ML
INJECTION, SOLUTION INTRAVENOUS CONTINUOUS
Status: DISCONTINUED | OUTPATIENT
Start: 2017-08-23 | End: 2017-08-23 | Stop reason: HOSPADM

## 2017-08-23 RX ORDER — HYDROCORTISONE 2.5 %
CREAM (GRAM) TOPICAL 3 TIMES DAILY PRN
Status: DISCONTINUED | OUTPATIENT
Start: 2017-08-23 | End: 2017-08-26 | Stop reason: HOSPADM

## 2017-08-23 RX ORDER — CEFAZOLIN SODIUM 2 G/100ML
2 INJECTION, SOLUTION INTRAVENOUS
Status: DISCONTINUED | OUTPATIENT
Start: 2017-08-23 | End: 2017-08-23 | Stop reason: HOSPADM

## 2017-08-23 RX ORDER — IBUPROFEN 400 MG/1
400-800 TABLET, FILM COATED ORAL EVERY 6 HOURS PRN
Status: DISCONTINUED | OUTPATIENT
Start: 2017-08-23 | End: 2017-08-26 | Stop reason: HOSPADM

## 2017-08-23 RX ORDER — FLUMAZENIL 0.1 MG/ML
0.2 INJECTION, SOLUTION INTRAVENOUS
Status: DISCONTINUED | OUTPATIENT
Start: 2017-08-23 | End: 2017-08-23 | Stop reason: HOSPADM

## 2017-08-23 RX ORDER — OXYTOCIN/0.9 % SODIUM CHLORIDE 30/500 ML
100 PLASTIC BAG, INJECTION (ML) INTRAVENOUS CONTINUOUS
Status: DISCONTINUED | OUTPATIENT
Start: 2017-08-23 | End: 2017-08-26 | Stop reason: HOSPADM

## 2017-08-23 RX ORDER — OXYTOCIN 10 [USP'U]/ML
10 INJECTION, SOLUTION INTRAMUSCULAR; INTRAVENOUS
Status: DISCONTINUED | OUTPATIENT
Start: 2017-08-23 | End: 2017-08-26 | Stop reason: HOSPADM

## 2017-08-23 RX ORDER — METHYLERGONOVINE MALEATE 0.2 MG/ML
200 INJECTION INTRAVENOUS
Status: DISCONTINUED | OUTPATIENT
Start: 2017-08-23 | End: 2017-08-26 | Stop reason: HOSPADM

## 2017-08-23 RX ORDER — AMOXICILLIN 250 MG
1-2 CAPSULE ORAL 2 TIMES DAILY
Status: DISCONTINUED | OUTPATIENT
Start: 2017-08-23 | End: 2017-08-26 | Stop reason: HOSPADM

## 2017-08-23 RX ORDER — MORPHINE SULFATE 1 MG/ML
INJECTION, SOLUTION EPIDURAL; INTRATHECAL; INTRAVENOUS PRN
Status: DISCONTINUED | OUTPATIENT
Start: 2017-08-23 | End: 2017-08-23

## 2017-08-23 RX ORDER — OXYTOCIN/0.9 % SODIUM CHLORIDE 30/500 ML
PLASTIC BAG, INJECTION (ML) INTRAVENOUS CONTINUOUS PRN
Status: DISCONTINUED | OUTPATIENT
Start: 2017-08-23 | End: 2017-08-23

## 2017-08-23 RX ORDER — DIPHENHYDRAMINE HCL 25 MG
25 CAPSULE ORAL EVERY 6 HOURS PRN
Status: DISCONTINUED | OUTPATIENT
Start: 2017-08-23 | End: 2017-08-26 | Stop reason: HOSPADM

## 2017-08-23 RX ORDER — KETOROLAC TROMETHAMINE 30 MG/ML
30 INJECTION, SOLUTION INTRAMUSCULAR; INTRAVENOUS EVERY 6 HOURS
Status: DISCONTINUED | OUTPATIENT
Start: 2017-08-23 | End: 2017-08-24

## 2017-08-23 RX ORDER — SODIUM CHLORIDE, SODIUM LACTATE, POTASSIUM CHLORIDE, CALCIUM CHLORIDE 600; 310; 30; 20 MG/100ML; MG/100ML; MG/100ML; MG/100ML
INJECTION, SOLUTION INTRAVENOUS CONTINUOUS
Status: DISCONTINUED | OUTPATIENT
Start: 2017-08-23 | End: 2017-08-23

## 2017-08-23 RX ORDER — ONDANSETRON 2 MG/ML
4 INJECTION INTRAMUSCULAR; INTRAVENOUS EVERY 30 MIN PRN
Status: DISCONTINUED | OUTPATIENT
Start: 2017-08-23 | End: 2017-08-24 | Stop reason: HOSPADM

## 2017-08-23 RX ORDER — FENTANYL CITRATE 50 UG/ML
25-50 INJECTION, SOLUTION INTRAMUSCULAR; INTRAVENOUS
Status: DISCONTINUED | OUTPATIENT
Start: 2017-08-23 | End: 2017-08-23 | Stop reason: HOSPADM

## 2017-08-23 RX ORDER — ONDANSETRON 2 MG/ML
INJECTION INTRAMUSCULAR; INTRAVENOUS PRN
Status: DISCONTINUED | OUTPATIENT
Start: 2017-08-23 | End: 2017-08-23

## 2017-08-23 RX ORDER — CEFAZOLIN SODIUM 1 G/3ML
1 INJECTION, POWDER, FOR SOLUTION INTRAMUSCULAR; INTRAVENOUS
Status: DISCONTINUED | OUTPATIENT
Start: 2017-08-23 | End: 2017-08-23 | Stop reason: HOSPADM

## 2017-08-23 RX ORDER — ONDANSETRON 2 MG/ML
4 INJECTION INTRAMUSCULAR; INTRAVENOUS EVERY 6 HOURS PRN
Status: DISCONTINUED | OUTPATIENT
Start: 2017-08-23 | End: 2017-08-26 | Stop reason: HOSPADM

## 2017-08-23 RX ORDER — BUPIVACAINE HYDROCHLORIDE AND EPINEPHRINE 2.5; 5 MG/ML; UG/ML
INJECTION, SOLUTION INFILTRATION; PERINEURAL PRN
Status: DISCONTINUED | OUTPATIENT
Start: 2017-08-23 | End: 2017-08-23

## 2017-08-23 RX ORDER — DEXTROSE, SODIUM CHLORIDE, SODIUM LACTATE, POTASSIUM CHLORIDE, AND CALCIUM CHLORIDE 5; .6; .31; .03; .02 G/100ML; G/100ML; G/100ML; G/100ML; G/100ML
INJECTION, SOLUTION INTRAVENOUS CONTINUOUS
Status: DISCONTINUED | OUTPATIENT
Start: 2017-08-23 | End: 2017-08-26 | Stop reason: HOSPADM

## 2017-08-23 RX ORDER — OXYCODONE HYDROCHLORIDE 5 MG/1
5-10 TABLET ORAL
Status: DISCONTINUED | OUTPATIENT
Start: 2017-08-23 | End: 2017-08-26 | Stop reason: HOSPADM

## 2017-08-23 RX ORDER — HYDROMORPHONE HYDROCHLORIDE 1 MG/ML
.3-.5 INJECTION, SOLUTION INTRAMUSCULAR; INTRAVENOUS; SUBCUTANEOUS EVERY 5 MIN PRN
Status: DISCONTINUED | OUTPATIENT
Start: 2017-08-23 | End: 2017-08-24 | Stop reason: HOSPADM

## 2017-08-23 RX ORDER — ACETAMINOPHEN 325 MG/1
650 TABLET ORAL EVERY 4 HOURS PRN
Status: DISCONTINUED | OUTPATIENT
Start: 2017-08-26 | End: 2017-08-26 | Stop reason: HOSPADM

## 2017-08-23 RX ORDER — CITRIC ACID/SODIUM CITRATE 334-500MG
30 SOLUTION, ORAL ORAL
Status: COMPLETED | OUTPATIENT
Start: 2017-08-23 | End: 2017-08-23

## 2017-08-23 RX ORDER — CEFAZOLIN SODIUM 1 G/3ML
INJECTION, POWDER, FOR SOLUTION INTRAMUSCULAR; INTRAVENOUS PRN
Status: DISCONTINUED | OUTPATIENT
Start: 2017-08-23 | End: 2017-08-23

## 2017-08-23 RX ORDER — FENTANYL CITRATE 50 UG/ML
25-50 INJECTION, SOLUTION INTRAMUSCULAR; INTRAVENOUS
Status: DISCONTINUED | OUTPATIENT
Start: 2017-08-23 | End: 2017-08-24 | Stop reason: HOSPADM

## 2017-08-23 RX ORDER — SIMETHICONE 80 MG
80 TABLET,CHEWABLE ORAL 4 TIMES DAILY PRN
Status: DISCONTINUED | OUTPATIENT
Start: 2017-08-23 | End: 2017-08-26 | Stop reason: HOSPADM

## 2017-08-23 RX ORDER — CARBOPROST TROMETHAMINE 250 UG/ML
250 INJECTION, SOLUTION INTRAMUSCULAR
Status: DISCONTINUED | OUTPATIENT
Start: 2017-08-23 | End: 2017-08-26 | Stop reason: HOSPADM

## 2017-08-23 RX ORDER — ACETAMINOPHEN 325 MG/1
975 TABLET ORAL EVERY 8 HOURS
Status: DISCONTINUED | OUTPATIENT
Start: 2017-08-23 | End: 2017-08-26 | Stop reason: HOSPADM

## 2017-08-23 RX ORDER — OXYTOCIN/0.9 % SODIUM CHLORIDE 30/500 ML
340 PLASTIC BAG, INJECTION (ML) INTRAVENOUS CONTINUOUS PRN
Status: DISCONTINUED | OUTPATIENT
Start: 2017-08-23 | End: 2017-08-26 | Stop reason: HOSPADM

## 2017-08-23 RX ORDER — DIPHENHYDRAMINE HYDROCHLORIDE 50 MG/ML
25 INJECTION INTRAMUSCULAR; INTRAVENOUS EVERY 6 HOURS PRN
Status: DISCONTINUED | OUTPATIENT
Start: 2017-08-23 | End: 2017-08-26 | Stop reason: HOSPADM

## 2017-08-23 RX ORDER — LANOLIN 100 %
OINTMENT (GRAM) TOPICAL
Status: DISCONTINUED | OUTPATIENT
Start: 2017-08-23 | End: 2017-08-26 | Stop reason: HOSPADM

## 2017-08-23 RX ORDER — NALOXONE HYDROCHLORIDE 0.4 MG/ML
.1-.4 INJECTION, SOLUTION INTRAMUSCULAR; INTRAVENOUS; SUBCUTANEOUS
Status: DISCONTINUED | OUTPATIENT
Start: 2017-08-23 | End: 2017-08-23 | Stop reason: HOSPADM

## 2017-08-23 RX ORDER — ONDANSETRON 4 MG/1
4 TABLET, ORALLY DISINTEGRATING ORAL EVERY 30 MIN PRN
Status: DISCONTINUED | OUTPATIENT
Start: 2017-08-23 | End: 2017-08-24 | Stop reason: HOSPADM

## 2017-08-23 RX ORDER — NALOXONE HYDROCHLORIDE 0.4 MG/ML
.1-.4 INJECTION, SOLUTION INTRAMUSCULAR; INTRAVENOUS; SUBCUTANEOUS
Status: DISCONTINUED | OUTPATIENT
Start: 2017-08-23 | End: 2017-08-26 | Stop reason: HOSPADM

## 2017-08-23 RX ORDER — BISACODYL 10 MG
10 SUPPOSITORY, RECTAL RECTAL DAILY PRN
Status: DISCONTINUED | OUTPATIENT
Start: 2017-08-25 | End: 2017-08-26 | Stop reason: HOSPADM

## 2017-08-23 RX ORDER — SODIUM CHLORIDE, SODIUM LACTATE, POTASSIUM CHLORIDE, CALCIUM CHLORIDE 600; 310; 30; 20 MG/100ML; MG/100ML; MG/100ML; MG/100ML
INJECTION, SOLUTION INTRAVENOUS CONTINUOUS
Status: DISCONTINUED | OUTPATIENT
Start: 2017-08-23 | End: 2017-08-24 | Stop reason: HOSPADM

## 2017-08-23 RX ORDER — CITRIC ACID/SODIUM CITRATE 334-500MG
SOLUTION, ORAL ORAL
Status: COMPLETED
Start: 2017-08-23 | End: 2017-08-23

## 2017-08-23 RX ORDER — BUPIVACAINE HYDROCHLORIDE 7.5 MG/ML
INJECTION, SOLUTION INTRASPINAL PRN
Status: DISCONTINUED | OUTPATIENT
Start: 2017-08-23 | End: 2017-08-23

## 2017-08-23 RX ORDER — MISOPROSTOL 200 UG/1
400 TABLET ORAL
Status: DISCONTINUED | OUTPATIENT
Start: 2017-08-23 | End: 2017-08-26 | Stop reason: HOSPADM

## 2017-08-23 RX ADMIN — OLANZAPINE 7.5 MG: 7.5 TABLET, FILM COATED ORAL at 22:14

## 2017-08-23 RX ADMIN — OXYTOCIN-SODIUM CHLORIDE 0.9% IV SOLN 30 UNIT/500ML 100 ML/HR: 30-0.9/5 SOLUTION at 16:00

## 2017-08-23 RX ADMIN — SODIUM CHLORIDE, POTASSIUM CHLORIDE, SODIUM LACTATE AND CALCIUM CHLORIDE: 600; 310; 30; 20 INJECTION, SOLUTION INTRAVENOUS at 14:15

## 2017-08-23 RX ADMIN — ACETAMINOPHEN 975 MG: 325 TABLET, FILM COATED ORAL at 19:29

## 2017-08-23 RX ADMIN — KETOROLAC TROMETHAMINE 30 MG: 30 INJECTION, SOLUTION INTRAMUSCULAR at 15:23

## 2017-08-23 RX ADMIN — SODIUM CHLORIDE, POTASSIUM CHLORIDE, SODIUM LACTATE AND CALCIUM CHLORIDE: 600; 310; 30; 20 INJECTION, SOLUTION INTRAVENOUS at 14:18

## 2017-08-23 RX ADMIN — PHENYLEPHRINE HYDROCHLORIDE 100 MCG: 10 INJECTION, SOLUTION INTRAMUSCULAR; INTRAVENOUS; SUBCUTANEOUS at 14:55

## 2017-08-23 RX ADMIN — SODIUM CITRATE AND CITRIC ACID MONOHYDRATE 30 ML: 500; 334 SOLUTION ORAL at 14:09

## 2017-08-23 RX ADMIN — BUPIVACAINE 20 ML: 13.3 INJECTION, SUSPENSION, LIPOSOMAL INFILTRATION at 17:00

## 2017-08-23 RX ADMIN — ONDANSETRON 4 MG: 2 INJECTION INTRAMUSCULAR; INTRAVENOUS at 14:55

## 2017-08-23 RX ADMIN — BUPIVACAINE HYDROCHLORIDE AND EPINEPHRINE BITARTRATE 20 ML: 2.5; .005 INJECTION, SOLUTION INFILTRATION; PERINEURAL at 17:00

## 2017-08-23 RX ADMIN — SENNOSIDES AND DOCUSATE SODIUM 1 TABLET: 8.6; 5 TABLET ORAL at 22:14

## 2017-08-23 RX ADMIN — CEFAZOLIN 2 G: 1 INJECTION, POWDER, FOR SOLUTION INTRAMUSCULAR; INTRAVENOUS at 14:33

## 2017-08-23 RX ADMIN — SODIUM CHLORIDE, POTASSIUM CHLORIDE, SODIUM LACTATE AND CALCIUM CHLORIDE: 600; 310; 30; 20 INJECTION, SOLUTION INTRAVENOUS at 00:53

## 2017-08-23 RX ADMIN — PHENYLEPHRINE HYDROCHLORIDE 0.4 MCG/KG/MIN: 10 INJECTION, SOLUTION INTRAMUSCULAR; INTRAVENOUS; SUBCUTANEOUS at 14:30

## 2017-08-23 RX ADMIN — Medication 30 ML: at 14:09

## 2017-08-23 RX ADMIN — OXYTOCIN-SODIUM CHLORIDE 0.9% IV SOLN 30 UNIT/500ML 300 ML/HR: 30-0.9/5 SOLUTION at 14:55

## 2017-08-23 RX ADMIN — KETOROLAC TROMETHAMINE 30 MG: 30 INJECTION, SOLUTION INTRAMUSCULAR at 22:14

## 2017-08-23 RX ADMIN — Medication 0.2 MG: at 14:30

## 2017-08-23 RX ADMIN — BUPIVACAINE HYDROCHLORIDE IN DEXTROSE 1.6 ML: 7.5 INJECTION, SOLUTION SUBARACHNOID at 14:30

## 2017-08-23 RX ADMIN — SODIUM CHLORIDE, POTASSIUM CHLORIDE, SODIUM LACTATE AND CALCIUM CHLORIDE 1000 ML: 600; 310; 30; 20 INJECTION, SOLUTION INTRAVENOUS at 13:04

## 2017-08-23 RX ADMIN — SODIUM CHLORIDE, SODIUM LACTATE, POTASSIUM CHLORIDE, CALCIUM CHLORIDE AND DEXTROSE MONOHYDRATE: 5; 600; 310; 30; 20 INJECTION, SOLUTION INTRAVENOUS at 18:33

## 2017-08-23 NOTE — PLAN OF CARE
Problem: Goal Outcome Summary  Goal: Goal Outcome Summary  Outcome: No Change  Pt fully prepped for primary C/S at 1410. S/p NICU consult. Pt's mother will be support person in OR. Pt anxious but states understanding of reason for urgent surgical delivery.

## 2017-08-23 NOTE — ANESTHESIA POSTPROCEDURE EVALUATION
Patient: Jazz Lal    Procedure(s):   - Wound Class: II-Clean Contaminated    Diagnosis:pregnancy   Diagnosis Additional Information: No value filed.    Anesthesia Type:  Spinal, Periph. Nerve Block for postop pain    Note:  Anesthesia Post Evaluation    Patient location during evaluation: OB PACU  Patient participation: Able to participate in evaluation but full recovery from regional anesthesia has not yet ocurrred but is anticipated to occur within 48 hours  Level of consciousness: awake and alert  Pain management: adequate  Airway patency: patent  Cardiovascular status: acceptable  Respiratory status: acceptable  Hydration status: acceptable  PONV: none     Anesthetic complications: None          Last vitals:  Vitals:    08/23/17 1655 08/23/17 1700 08/23/17 1710   BP: 107/67 112/62 104/69   Resp: 17 18 24   Temp:      SpO2: 99% 100% 100%         Electronically Signed By: Galen Barrios MD  August 23, 2017  5:17 PM

## 2017-08-23 NOTE — DISCHARGE SUMMARY
[facility] Discharge Summary    Jazz Lal MRN# [id number]   Age: 26 year old YOB: 1990     Date of Admission:  2017  Date of Discharge::  2017   Admitting Physician:  Laura Samano, DO  Discharge Physician:  Dagoberto Borjas          Admission Diagnoses:   -  at 35w2d  - BPP, nonreactive NST  - Symmetric IUGR  - History of bipolar disorder          Discharge Diagnosis:   -  s/p delivery as noted below  - Infant with multiple anomalies - symmetric IUGR, fetal horseshoe kidney, premature closure of ductus arteriosus resulting in right heart failure  - History of bipolar disorder and postpartum psychosis          Procedures:   Procedure(s): BPP  Fetal Echocardiogram  Continuous EFM  Rescue BMZ x1   Primary low transverse  section  Spinal          Medications Prior to Admission:     Prescriptions Prior to Admission   Medication Sig Dispense Refill Last Dose     OLANZapine (ZYPREXA) 5 MG tablet Take 7.5 mg by mouth   2017 at Unknown time     hydrOXYzine (ATARAX) 10 MG/5ML syrup Take 25 mg by mouth daily   2017 at Unknown time     Ondansetron HCl (ZOFRAN PO) Take by mouth as needed for nausea or vomiting   2017 at Unknown time     Prenatal Vit-Fe Fumarate-FA (PRENATAL MULTIVITAMIN PLUS IRON) 27-0.8 MG TABS per tablet Take 1 tablet by mouth daily   2017 at Unknown time     cyanocobalamin (VITAMIN B12) 1000 MCG/ML injection Inject 1 mL into the muscle every 30 days   2017 at Unknown time             Discharge Medications:        Review of your medicines      START taking       Dose / Directions    divalproex 500 MG 24 hr tablet   Commonly known as:  DEPAKOTE ER   Used for:  S/P  section        Dose:  500 mg   Take 1 tablet (500 mg) by mouth At Bedtime   Quantity:  30 tablet   Refills:  1       ibuprofen 600 MG tablet   Commonly known as:  ADVIL/MOTRIN   Used for:  S/P  section        Dose:  600 mg   Take 1 tablet (600 mg)  by mouth every 6 hours as needed for other (cramping)   Quantity:  30 tablet   Refills:  0       oxyCODONE-acetaminophen 5-325 MG per tablet   Commonly known as:  PERCOCET   Used for:  S/P  section        Dose:  1-2 tablet   Take 1-2 tablets by mouth every 4 hours as needed for pain maximum 8 tablet(s) per day   Quantity:  30 tablet   Refills:  0       senna-docusate 8.6-50 MG per tablet   Commonly known as:  SENOKOT-S;PERICOLACE   Used for:  S/P  section        Dose:  1-2 tablet   Take 1-2 tablets by mouth 2 times daily as needed for constipation   Quantity:  60 tablet   Refills:  1         CONTINUE these medicines which may have CHANGED, or have new prescriptions. If we are uncertain of the size of tablets/capsules you have at home, strength may be listed as something that might have changed.       Dose / Directions    OLANZapine 10 MG tablet   Commonly known as:  zyPREXA   This may have changed:    - medication strength  - how much to take  - when to take this   Used for:  S/P  section        Dose:  10 mg   Take 1 tablet (10 mg) by mouth At Bedtime   Quantity:  30 tablet   Refills:  1         CONTINUE these medicines which have NOT CHANGED       Dose / Directions    cyanocobalamin 1000 MCG/ML injection   Commonly known as:  VITAMIN B12        Dose:  1 mL   Inject 1 mL into the muscle every 30 days   Refills:  0       prenatal multivitamin plus iron 27-0.8 MG Tabs per tablet        Dose:  1 tablet   Take 1 tablet by mouth daily   Refills:  0         STOP taking          hydrOXYzine 10 MG/5ML syrup   Commonly known as:  ATARAX           ZOFRAN PO                Where to get your medicines      These medications were sent to [facility] [place], [place] - [address]  [address] [address]     Phone:  [phone]      divalproex 500 MG 24 hr tablet     ibuprofen 600 MG tablet     OLANZapine 10 MG tablet     senna-docusate 8.6-50 MG per tablet          Some of these will need a paper prescription and others can be bought over the counter. Ask your nurse if you have questions.     Bring a paper prescription for each of these medications      oxyCODONE-acetaminophen 5-325 MG per tablet                   Consultations:     Neonatology, Pediatric Cardiology, Social Work, Psychiatry          Brief Admission History     Ms. Jazz Lal is a 26 year old  at 35w2d by 6w3d US, who is presents via ambulance from outside clinic. She was diagnosed with IUGR on L2 US obtained at 25w2d for h/o use of bipolar medications. Since then she has been followed with twice weekly BPP/NST. BPPs have been reassuring. NSTs have been non-reactive throughout. She also had slightly elevated UA doppler on 17. She received BMZx2 three weeks ago. She was admitted to OSH for observation at 34w6d with non-reassuring NST, she was sent home after reassuring BPP was obtained. Today's NST showed minimal variability and multiple decels. There was also reportedly fluid collection around fetal heart and abnormality in heart contraction. BPP was 8/8. She denies contractions, vaginal bleeding, and loss of fluid.   + normal fetal movement. She does nightly kick counts, all of which have been normal. No headache or visual changes. No fevers or chills. No chest pain or shortness of breath. No edema. She has had persistent nausea throughout pregnancy, for which she takes Zofran. No vomiting.            Hospital Course:     She was admitted and underwent US evaluation which demonstrated a horseshoe kidney, pericardial effusion, concern for hypoplastic left heart. Her tracing at that time was concerning for large periods of minimal variability and she received continuous fetal monitoring. She received a rescue course of betamethasone and was consented for a  section for fetal indications. She underwent repeat BPP on HD#2 which was 8/8 and her fetal heart tracing remained Category II d/t  minimal variability and intermittent decels/variables. She received a fetal echocardiogram which demonstrated premature closure of the ductus arteriosus resulting in right heart failure/hypertrophy. Multi-disciplinary phone conference with pediatric cardiology and decision made to proceed with delivery given concerns for progressing heart failure. Multi-disciplinary phone conference with pediatric cardiology regarding newly diagnosed condition, resulted in decision made to proceed with delivery given concerns for progressing heart failure. Due to high concern that this fetus would not tolerate labor and due to inability to adequately monitor and extrapolate changes in fetal heart tracing d/t its continuous Category II FHT, recommendations were made to proceed with  section.The risks, benefits, and alternatives of  section were discussed with the patient, and she agreed to proceed.      She underwent primary low transverse  section on 17. Her surgery was overall uncomplicated. EBL 900cc.     Intra-operative findings:  1. Clear amniotic fluid  2. Liveborn female infant in cephalic presentation. Apgars 8 at 1 minute & 8 at 5 minutes. Weight 2040 g.  3. Arterial cord gases not collected. Venous cord pH 7.36, base deficit 2.5.  4. Normal uterus with two small subserosal fibroids (1-2 cm), fallopian tubes, and ovaries.             Postpartum Course:     Her postpartum course was uncomplicated. Given her history of bipolar disorder she was seen by social work and psychiatry during her admission. She was continued on her PTA zyprexa and restarted on Depakote 500 mg QHS per her outside psychiatrist with intent to slowly increase dose. On PPD#3, she was meeting all of her postpartum goals and deemed stable for discharge. She was voiding without difficulty, tolerating a regular diet without nausea and vomiting, her pain was well controlled on oral pain medicines and her lochia was appropriate. Her  hemoglobin prior to delivery was 11.9 and after delivery was 10.5. Her Rh status was positive, and Rhogam was not indicated.              Discharge Instructions and Follow-Up:   Discharge diet: Regular   Discharge activity: No driving while on narcotic medications  No lifting >8-10 Ibs for 4-6 weeks  No intercourse until 6 weeks postpartum   Discharge follow-up: Follow up with PCP in 1 week for a mood check and 6 weeks for a PPV  [facility] Soonest available appointment is November 3 at 8:25 am. She was placed on cancellation list, with hope of getting her appointment moved up within the next week.            Discharge Disposition:   Discharged to home      Luma Florez MD  OB/GYN Resident, PGY-3  8/27/2017 10:22 AM           Physician Attestation   I, Dagoberto Borjas, saw this patient with the resident and agree with the resident s findings and plan of care as documented in the resident s note.      I personally reviewed vital signs, medications and labs.    Key findings: postpartum care    Dagoberto Borjas  Date of Service (when I saw the patient): 8/26/17    Time Spent on this Encounter   I, Dagoberto Borjas, spent a total of 15 minutes bedside and on the inpatient unit today managing the care of Jazz Lal.  Over 50% of my time on the unit was spent counseling the patient and /or coordinating care regarding postcesarean delivery care. See note for details.

## 2017-08-23 NOTE — PLAN OF CARE
Problem: Individualization  Goal: Patient Preferences  Outcome: Declining  FHT's continue with minimal to moderate variability, no accelerations, variable decels, and 2 prolonged decels overnight.  Dr Marshall aware and reviewed strip.  Pt denies complaints.  Plan to continue monitoring and await fetal echo results in AM.  Pt and significant other had verbal argument overnight and he left.  Pt would like him to be allowed back if he comes and feels he is just overwhelmed with the situation.  She called her mother to come be with her.  Pt is also requesting that providers share medical information with her mother and father.

## 2017-08-23 NOTE — ANESTHESIA CARE TRANSFER NOTE
Patient: Jazz Lal    Procedure(s):   - Wound Class: II-Clean Contaminated    Diagnosis: pregnancy   Diagnosis Additional Information: No value filed.    Anesthesia Type:   Spinal, Periph. Nerve Block for postop pain     Note:  Airway :Room Air  Patient transferred to:Labor and Delivery  Comments: Pt to LD recovery.  VSS.  Report to RN, questions answered.       Vitals: (Last set prior to Anesthesia Care Transfer)    CRNA VITALS  8/23/2017 1504 - 8/23/2017 1540      8/23/2017             NIBP: 100/60    Pulse: 84    NIBP Mean: 80    SpO2: 97 %                Electronically Signed By: MADIHA Christensen CRNA  August 23, 2017  3:40 PM

## 2017-08-23 NOTE — PLAN OF CARE
Problem: Individualization  Goal: Patient Preferences  Outcome: No Change  FHT's continue with minimal to moderate variability and occasional variable decels, no changes.  Pt denies complaints, reports being happy that they found a second kidney on US today- does not seem to be processing infants cardiac issues.  Continue monitoring.

## 2017-08-23 NOTE — OP NOTE
[facility]  Full Operative Progress Note     Surgery Date: 2017     Surgeon: Laura Samano DO     Assistants: Luma Florez MD, PGY-3     Pre-op Diagnosis:                     - Intrauterine pregnancy at 35w3d  - Premature closure of ductus arteriosus resulting in right ventricular hypertrophy/decreased contractility, significant tricuspid regurgitation and impending heart failure of fetus  - Symmetric fetal growth restriction  - Horseshoe kidney  - Bipolar disorder  - Obesity, BMI 30     Post-op Diagnosis:                   - Same   - Liveborn female infant      Procedure: Primary low-transverse  section with double layer uterine closure via Pfannenstiel incision    Anesthesia: Spinal    EBL:  900 mL    IVF:  1400 mL crystalloid    UOP:  400 mL clear urine at the end of the case    Drains: Sepulveda Catheter     Specimens:  Cord blood, cord gases, placenta    Complications: None apparent    Indications:   Ms. Jazz Lal is a 26 year old  at 35w23 by 6w3d US with FGR and congenital cardiac malformation who was admitted for further monitoring and evaluation. Her pregnancy was otherwise complicated by: perceived developmental delay and bipolar disorder.  After her admission, fetal echocardiogram was obtained, which was notable for premature closure of the ductus arteriosus resulting in right heart failure/hypertrophy.  Multi-disciplinary phone conference with pediatric cardiology regarding newly diagnosed condition, resulted in decision made to proceed with delivery given concerns for progressing heart failure. Due to high concern that this fetus would not tolerate labor and due to inability to adequately monitor and extrapolate changes in fetal heart tracing d/t its continuous Category II FHT, recommendations were made to proceed with  section.  The risks, benefits, and alternatives of  section were discussed with the patient, and she agreed to proceed.      Findings:   1. Clear amniotic fluid  2. Liveborn female infant in cephalic presentation. Apgars 8 at 1 minute & 8 at 5 minutes. Weight 2040 g.  3. Venous cord pH 7.36, base deficit 2.5.  4. Normal uterus with two small subserosal fibroids (1-2 cm), fallopian tubes, and ovaries.     Procedure Details:   The patient was brought to the OR, where adequate spinal anesthesia was administered.  She was placed in the dorsal supine position with a slight leftward tilt. She was prepped and draped in the usual sterile fashion. A surgical time out was performed. A pfannenstiel skin incision was made with the scalpel, and carried down to the underlying fascia with sharp and blunt dissection. The fascia was incised in the midline, and the incision was extended laterally with the Yu scissors. The superior aspect of the fascia was grasped with the Kocher clamps and dissected off of the underlying rectus muscles with blunt and sharp dissection. Attention was then turned to the inferior aspect of the fascia, which was similarly dissected off of the underlying rectus muscles. The rectus muscles were  in the midline, and the peritoneum was entered bluntly, and the opening was extended with digital pressure.  A transverse hysterotomy was made with the scalpel in the lower uterine segment, and the incision was extended with digital pressure. The infant was noted to be in the cephalic position, and was delivered atraumatically. The shoulders delivered easily. The cord was doubly clamped and cut, and the infant was handed off to the awaiting NICU staff. A segment of cord was cut and handed off to the OR staff. The placenta was delivered with gentle traction on the umbilical cord and uterine massage. The uterus was exteriorized and cleared of all clots and debris. Uterine tone was noted to be firm with 20 units of pitocin given through the running IV and uterine massage.  The hysterotomy was closed with a running locked  suture of 0 Vicryl.  The hysterotomy was then imbricated using an 0 Monocryl suture. The hysterotomy was noted to be hemostatic. The posterior cul-de-sac was cleared of all clots and debris. The uterus was returned to the abdomen. The pericolic gutters were cleared of all clots and debris. The hysterotomy was reexamined and noted to be hemostatic. The fascia and rectus muscles were examined and areas of oozing were controlled with electrocautery. The fascia was closed with a running 0 Vicryl suture. The subcutaneous tissue was irrigated and areas of oozing were controlled with electrocautery. The subcutaneous tissue was less than 2 cm in thickness, and was therefore not closed. The skin was closed with 4-0 Monocryl and covered with a sterile dressing.    All sponge, needle, and instrument counts were correct. The patient tolerated the procedure well, and was transferred to recovery in stable condition. Dr. Samano was present and scrubbed for the entirety of the procedure.     Luma Florez MD  OB/GYN Resident, PGY-3  2017 3:40 PM      Attending Attestation Note:    I was present for the entire  section on [facility] for at 35w3d.        For a detailed description of the operative procedure, please see the operative report by the resident.    Laura Samano DO  Maternal-Fetal Medicine

## 2017-08-23 NOTE — PLAN OF CARE
Problem: Goal Outcome Summary  Goal: Goal Outcome Summary  Outcome: No Change  Pt returned from Ultrasound. Lr changed to continuous. Dr Samano and Dr Pinto at bedside to discuss ultrasound with pt and FOB. Pt has many questions, sometimes the same question is asked in multiple ways. Pt ordered dinner. Waiting for betamethasone from Pharmacy currently.

## 2017-08-23 NOTE — PROGRESS NOTES
Brief progress note:  Fetal echocardiogram performed this morning - evidence of premature closure of the ductus arteriosus resulting in right heart failure/hypertrophy, significant right ventricle hypertrophy and contractility. Multi-disciplinary phone conference with pediatric cardiology and decision made to proceed with delivery given concerns for progressing heart failure.     Continued Category II FHT with intermittent decels/variables. Concern high that this fetus will not tolerate labor or be able to adequately monitor fetal status and thus recommendations were made to proceed with  section.  Also prolonged induction of labor may be detrimental. NPO at 800 am this morning. NICU consult placed and aware. Will contact Anesthesia when able.  Orders in.    Luma Florez MD  OB/GYN Resident, PGY-3  2017 12:30 PM      Attending Attestation:  The documentation recorded by the resident accurately reflects our conversations with the patient.  Plan is for TORCH titers with next blood draw and karyotype/CGH cord bloods at time of delivery.      Laura Samano, DO  Maternal Fetal Medicine

## 2017-08-23 NOTE — ANESTHESIA PROCEDURE NOTES
Peripheral Nerve Block Procedure Note    Staff:     Anesthesiologist:  KRISTA BECKHAM  Location: Pre-op  Procedure Start/Stop TImes:      8/23/2017 4:50 PM     8/23/2017 5:00 PM    Correct Patient: Yes      Correct Position: Yes      Correct Site: Yes      Correct Procedure: Yes      Correct Laterality:  Yes    Site Marked:  Yes  Procedure details:     Procedure:  TAP    ASA:  2    Diagnosis:  C section    Laterality:  Bilateral    Position:  Supine    Sterile Prep: chloraprep, mask and sterile gloves      Local skin infiltration:  None    Needle:  Short bevel    Needle gauge:  21    Needle length (mm):  110    Ultrasound: Yes      Ultrasound used to identify targeted nerve, plexus, or vascular structure and placed a needle adjacent to it      Permanent Image entered into patiient's record      Abnormal pain on injection: No      Blood Aspirated: No      Paresthesias:  No    Bleeding at site: No      Bolus via:  Needle    Infusion Method:  Single Shot    Complications:  None  Assessment/Narrative:     Injection made incrementally with aspirations every (mL):  5     Bilateral TAP blocks (10ml exparel + 10ml 0.25% bupivacaine w 1:200:000 epi into each TAP site)    Discussed with Patient Off-Label use of Liposomal Bupivacaine (Exparel) for Nerve Block.    Relevant risks & benefits were discussed with patient.    All questions were answered and there was agreement to proceed.

## 2017-08-23 NOTE — PLAN OF CARE
Data: Jazz Lal transferred to 422 via cart from OB PACU at 1800. Baby in NICU- Jazz brought to NICU for brief visit before transferring to room.  Action: Receiving unit notified of transfer: Yes. Patient and family notified of room change. Report given to Aileen MORRIS at 1810. Belongings sent to receiving unit. Accompanied by Registered Nurse. Oriented patient to surroundings. Call light within reach.   Response: Patient tolerated transfer and is stable.

## 2017-08-23 NOTE — PROGRESS NOTES
"[facility] Antepartum Progress Note    Subjective:   Patient resting comfortably in bed this morning. Denies contractions, leakage of fluid, or vaginal bleeding. She endorses active fetal movement. Does not have any specific questions or concerns this morning. Plans for fetal evaluation reviewed for today.    Objective:  Vitals:    17 2022 17 2200 17 0055 17 0756   BP: 115/71 106/58 97/50 110/77   Resp: 16  16    Temp: 98.3  F (36.8  C)  98.2  F (36.8  C)    TempSrc: Oral  Oral    Weight:       Height:         Gen: Resting comfortably in bed  CV: Regular rate  Resp: Normal respiratory effort  Abd: Gravid, non-tender  Ext: No edema    FHT: Baseline 125, minimal to moderate variability, accels present, intermittent decels/variables overnight.  [place]: no contractions    Please see \"Imaging\" tab under \"Chart Review\" for details of today's US.    Assessment/Plan:   Ms. Jazz Lal is a 26 year old  at 35w23 by 6w3d US with FGR and congenital cardiac malformation who is admitted for further monitoring and evaluation. Her pregnancy is otherwise complicated by: perceived developmental delay and bipolar disorder.       #) Symmetric fetal growth restriction/Cardiac malformation/horeshoe kidney  - FHT with minimal to moderate variability and non-reactivity. This is consistent with previous NSTs she has had during pregnancy and may be secondary to underlying genetic or cardiac abnormalities as BPPs have always been reassuring.  - Will start TID NSTs with repeat BPP/UARs in am.  Delivery would be indicated for prolonged or repetitive decelerations.    - Rescue course of betamethasone, second dose to be given this evening.    - Will arrange NICU and social work consultation in am after fetal ECHO.  - Considering TORCH titers and genetic screening/testing, will meet with our genetic counselor today following fetal ECHO.    #) Bipolar disorder  - Continue home Zyprexa  - S/p Depakote for 1st month of " pregnancy, patient self d/c.  - UDS  - Social work consult as parents have custody of 1st child    #) Prenatal care  - AB+/Ab neg, RI/RPR neg/Hep B neg/HIV neg  - GCT: 114  - GBS: unknown  - Consented for  section obtained 17. Reviewed possibility of emergent vs. urgent  section.       Seen and staffed with Dr. Selwyn Florez MD  OB/GYN Resident, PGY-3  2017 9:21 AM      Attending Attestation:    I agree with the residents note above.      I spent 15 minutes total face-to-face with this inpatient, and >50% of the time was spent in counseling and/or coordination of care.    Laura Samano, DO  Maternal Fetal Medicine

## 2017-08-23 NOTE — ANESTHESIA PROCEDURE NOTES
Spinal/LP Procedure Note    Spinal Block  Staff:     Anesthesiologist:  KRISTA BECKHAM  Location: OB  Procedure Start/Stop Times:      8/23/2017 2:28 PM     8/23/2017 2:30 PM    patient identified, IV checked, site marked, risks and benefits discussed, informed consent, monitors and equipment checked, pre-op evaluation, at physician/surgeon's request and post-op pain management      Correct Patient: Yes      Correct Position: Yes      Correct Site: Yes      Correct Procedure: Yes      Correct Laterality:  Yes    Site Marked:  Yes  Procedure:     Procedure:  Intrathecal    ASA:  2 and Emergent    Diagnosis:  Poor fetal cardiac echo    Position:  Sitting    Sterile Prep: chloraprep, mask, sterile gloves and patient draped      Insertion site:  L3-4    Approach:  Midline    Needle Type:  Pencan    Local Skin Infiltration:  1% lidocaine    amount (ml):  3    Needle Length (in):  4    Introducer used: Yes      Attempts:  1    Redirects:  0    CSF:  Clear    Paresthesias:  No    Time injected:  14:30  Assessment/Narrative:     Sensory Level:  T5

## 2017-08-23 NOTE — CONSULTS
_       [facility]                      Neonatology Advanced Practice Antepartum Counseling Consult      I was asked to provide antepartum counseling for Jazz Lal at the request of Laura Samano DO secondary to  delivery of infant with suspected right sided heart failure based on echocardiogram. Ms. Lal is currently 35 2/7weeks and has a hx significant for perceived developmental delay and bipolar disorder. Betamethasone was administered on 17. Ms. Lal, accompanied by her mother, was counseled on the delivery room resuscitation and the initial NICU admission.  I reviewed the expected hospital course and the initial studiesthat are needed after delivery.  The counseling included: morbidity and mortality, initial delivery room stabilization, respiratory course, RDS, hemodynamic support, nutrition and growth and development. Mother and grandmother asking appropriate questions and verbalize understanding.  Please feel free to call with any additional questions or concerns.          Keila Queen NNP intern   Advanced Practice Service    Intensive Care Unit  [facility]    MADIHA Carreon, CNP            Floor Time (min): 20 minutes  Face to Face Time (min): 20 minutes  Total Time (minutes): 40 minutes  More than 50% of my time was spent in direct, face to face, antepartum counseling with the above patient.

## 2017-08-23 NOTE — ANESTHESIA PREPROCEDURE EVALUATION
Physical Exam  Normal systems: pulmonary    Airway   Mallampati: II  TM distance: >3 FB  Neck ROM: full    Dental     Cardiovascular       Pulmonary                     Anesthesia Plan      History & Physical Review  History and physical reviewed and following examination; no interval change.    ASA Status:  2 emergent.    NPO Status:  > 6 hours    Plan for Spinal and Periph. Nerve Block for postop pain   PONV prophylaxis:  Ondansetron (or other 5HT-3)       Postoperative Care  Postoperative pain management:  Neuraxial analgesia, IV analgesics, Oral pain medications, Multi-modal analgesia and Peripheral nerve block (Single Shot).      Consents  Anesthetic plan, risks, benefits and alternatives discussed with:  Patient..        ANESTHESIA PREOP EVALUATION    PROCEDURE: Procedure(s):   - Wound Class: II-Clean Contaminated    HPI: Jazz Lal is a 26 year old female who presents for above procedure 2/2 poor fetal status.     PAST MEDICAL HISTORY:    Past Medical History:   Diagnosis Date     Mental disorder     Bipolar disorder- currently taking zyprexa- will take depakote after delivery       PAST SURGICAL HISTORY:    History reviewed. No pertinent surgical history.    PAST ANESTHESIA HISTORY:     No personal or family h/o anesthesia problems    SOCIAL HISTORY:       Social History   Substance Use Topics     Smoking status: Never Smoker     Smokeless tobacco: Never Used     Alcohol use No       ALLERGIES:     Allergies   Allergen Reactions     Bees Anaphylaxis and Hives       MEDICATIONS:     Prescriptions Prior to Admission   Medication Sig Dispense Refill Last Dose     OLANZapine (ZYPREXA) 5 MG tablet Take 7.5 mg by mouth   8/22/2017 at Unknown time     hydrOXYzine (ATARAX) 10 MG/5ML syrup Take 25 mg by mouth daily   8/22/2017 at Unknown time     Ondansetron HCl (ZOFRAN PO) Take by mouth as needed for nausea or vomiting   8/22/2017 at Unknown time     Prenatal Vit-Fe Fumarate-FA (PRENATAL MULTIVITAMIN PLUS  IRON) 27-0.8 MG TABS per tablet Take 1 tablet by mouth daily   8/22/2017 at Unknown time     cyanocobalamin (VITAMIN B12) 1000 MCG/ML injection Inject 1 mL into the muscle every 30 days   8/22/2017 at Unknown time       No current outpatient prescriptions on file.       Current Facility-Administered Medications Ordered in Epic   Medication Dose Route Frequency Provider Last Rate Last Dose     lactated ringers infusion   Intravenous Continuous Laura Samano DO         sodium citrate-citric acid (BICITRA) solution 30 mL  30 mL Oral Pre-Op/Pre-procedure x 1 dose Laura Samano DO         ceFAZolin sodium-dextrose (ANCEF) infusion 2 g  2 g Intravenous Pre-Op/Pre-procedure x 1 dose Laura Samano DO         ceFAZolin (ANCEF) 1 g vial to attach to  ml bag for ADULT or 50 ml bag for PEDS  1 g Intravenous Q2H PRN Laura Samano DO         lactated ringers infusion   Intravenous Continuous Laura Samano DO         sodium citrate-citric acid (BICITRA) 500-334 MG/5ML solution              nitrous oxide/oxygen 50/50 blend   Inhalation Continuous PRN Laura Samano DO         No Tdap Needed - Assessment: Patient does not need Tdap vaccine   Does not apply Continuous PRN Laura Samano DO         lidocaine 1 % 1 mL  1 mL Other Q1H PRN Laura Samano DO         lidocaine (LMX4) kit   Topical Q1H PRN Laura Samano DO         sodium chloride (PF) 0.9% PF flush 3 mL  3 mL Intracatheter Q1H PRN Laura Samano DO         sodium chloride (PF) 0.9% PF flush 3 mL  3 mL Intracatheter Q8H Laura Samano DO         betamethasone acet & sod phos (CELESTONE) injection 12 mg  12 mg Intramuscular Q24H Laura Samano DO   12 mg at 08/22/17 1952     No Tdap Needed - Assessment: Patient does not need Tdap vaccine   Does not apply Continuous PRN Laura Samano DO         OLANZapine (zyPREXA) tablet 7.5 mg  7.5 mg Oral At Bedtime Ruby Marshall MD   7.5 mg at 08/22/17 1736      hydrOXYzine (ATARAX) tablet 25 mg  25 mg Oral At Bedtime Ruby Marshall MD   25 mg at 17 9714     No current [facility]-ordered outpatient prescriptions on file.       PHYSICAL EXAM:    Vitals: T 98.7, P Data Unavailable, /77, R 18, SpO2  , Weight   Wt Readings from Last 2 Encounters:   17 80.3 kg (177 lb)       See doc flowsheet    Temp: 37.1  C (98.7  F) Temp  Min: 36.7  C (98.1  F)  Max: 37.1  C (98.7  F)  Resp: 18 Resp  Min: 16  Max: 18    No Data Recorded    No Data Recorded    No Data Recorded  BP: 110/77 Systolic (24hrs), Av , Min:97 , Max:122   Diastolic (24hrs), Av, Min:50, Max:77      NPO STATUS: see doc flowsheet    LABS:    BMP:  No results for input(s): NA, POTASSIUM, CHLORIDE, CO2, BUN, CR, GLC, ANTONIO in the last 72972 hours.    LFTs:   No results for input(s): PROTTOTAL, ALBUMIN, BILITOTAL, ALKPHOS, AST, ALT, BILIDIRECT in the last 34770 hours.    CBC:   Recent Labs   Lab Test  17   1552   WBC  9.4   RBC  3.85   HGB  11.9   HCT  35.3   MCV  92   MCH  30.9   MCHC  33.7   RDW  13.7   PLT  219       Coags:  No results for input(s): INR, PTT, FIBR in the last 58375 hours.    Imaging:  Maternal Fetal US Comprehensive [place] GROSS   Final Result   IMPRESSION   ---------------------------------------------------------------------------------------------------------   1) Intrauterine pregnancy at 35+3 weeks gestational age.   2) Mild pericardial effusion improved since yesterday.   3) Reassuring BPP.   4) Normal umbilical artery doppler.            Maternal Fetal US Comprehensive Single   Final Result   IMPRESSION   ---------------------------------------------------------------------------------------------------------   1) Intrauterine pregnancy at 35+2 weeks gestational age.   2) Concerns for possible hypoplastic left heart as LV<RV, narrowed LVOT, significant tricuspid regurgitation, right ventricle hypertrophy, pericardial effusion. Horseshoe   kidney. None of the other  anomalies commonly detected by ultrasound were evident in the limited fetal anatomic survey described above.   3) Growth parameters and estimated fetal weight were consistent with fetal growth restriction with AC at the 3%tile, HC at the 1%tile, however > 2 SD above the mean and   not concerning for microcephaly . EFW 10%tile.   4) The amniotic fluid volume appeared normal.   5) Normal umbilical artery doppler.   6) Reassuring BPP.            Maternal Fetal BPP Single    (Results Pending)       Galen Barrios MD  Anesthesiology Staff  Pager [phone]    8/23/2017  1:25 PM

## 2017-08-23 NOTE — BRIEF OP NOTE
[facility]   Brief Operative Note     Surgery Date: 2017    Surgeon:  Laura Samano MD    Assistants:  Luma Florez MD, PGY-3    Pre-op Diagnosis:    - Intrauterine pregnancy at 35w3d  - Premature closure of ductus arteriosus resulting in right heart failure of fetus  - Symmetric fetal growth restriction  - Bipolar disorder  - Obesity, BMI 30    Post-op Diagnosis:    - Same   - Liveborn female infant     Procedure:  Primary low-transverse  section with double layer uterine closure via Pfannenstiel incision    Anesthesia: Spinal    EBL:  900 mL    IVF:  1400 mL crystalloid    UOP:  400 mL clear urine at the end of the case    Drains: Sepulveda Catheter     Specimens:  Cord blood, cord gases, placenta    Complications: None apparent    Findings:   1. Clear amniotic fluid  2. Liveborn female infant in cephalic presentation. Apgars 8 at 1 minute & 8 at 5 minutes. Weight pending.  3. Cord gases pending  4. Normal uterus with two small subserosal fibroids (1-2 cm), fallopian tubes, and ovaries.     Disposition:  Transferred in stable condition to [facility]    Luma Florez MD  OB/GYN Resident, PGY-3  2017 3:35 PM

## 2017-08-23 NOTE — PROVIDER NOTIFICATION
08/23/17 0830   Provider Notification   Provider Name/Title [facility] team   Method of Notification At Bedside   Request Evaluate in Person   Notification Reason Status Update  (AM ROUNDS)   Plan for echo in [facility] at 1100 today. S/p morning BPP, ok to change from continuous EFM to TID monitoring. Discontinue LR hydration. 2nd beta to be given today 1900.

## 2017-08-23 NOTE — PLAN OF CARE
Problem: Individualization  Goal: Patient Preferences  Outcome: Improving  Patient transferred to OB PACU after an uneventful primary  section for fetal indications. Baby born at 1453. NICU was present and took baby to NICU for further observation. Patient is stable an denies pain at this time. Will monitor closely for 2 hours and then will transfer to antepartum (NFCC is currently full).

## 2017-08-23 NOTE — PLAN OF CARE
Arrived to Antepartum from PACU at 1800 in stable condition. Was able to visit baby in NICU prior to arrival. VSS. Requesting light diet, denies n/v. Denies pain. Reviewed pain med options/schedule, will start Tylenol with first solid food. Oriented to room and PP folder.

## 2017-08-24 LAB — HGB BLD-MCNC: 10.6 G/DL (ref 11.7–15.7)

## 2017-08-24 PROCEDURE — 86645 CMV ANTIBODY IGM: CPT | Performed by: OBSTETRICS & GYNECOLOGY

## 2017-08-24 PROCEDURE — 86406 PARTICLE AGGLUT ANTBDY TITR: CPT | Performed by: OBSTETRICS & GYNECOLOGY

## 2017-08-24 PROCEDURE — 86777 TOXOPLASMA ANTIBODY: CPT | Performed by: OBSTETRICS & GYNECOLOGY

## 2017-08-24 PROCEDURE — 36415 COLL VENOUS BLD VENIPUNCTURE: CPT | Performed by: OBSTETRICS & GYNECOLOGY

## 2017-08-24 PROCEDURE — 12000030 ZZH R&B OB INTERMEDIATE UMMC

## 2017-08-24 PROCEDURE — 85018 HEMOGLOBIN: CPT | Performed by: OBSTETRICS & GYNECOLOGY

## 2017-08-24 PROCEDURE — 86644 CMV ANTIBODY: CPT | Performed by: OBSTETRICS & GYNECOLOGY

## 2017-08-24 PROCEDURE — 25000132 ZZH RX MED GY IP 250 OP 250 PS 637: Performed by: OBSTETRICS & GYNECOLOGY

## 2017-08-24 PROCEDURE — 86778 TOXOPLASMA ANTIBODY IGM: CPT | Performed by: OBSTETRICS & GYNECOLOGY

## 2017-08-24 RX ORDER — DIVALPROEX SODIUM 500 MG/1
500 TABLET, EXTENDED RELEASE ORAL AT BEDTIME
Status: DISCONTINUED | OUTPATIENT
Start: 2017-08-24 | End: 2017-08-26 | Stop reason: HOSPADM

## 2017-08-24 RX ADMIN — IBUPROFEN 800 MG: 400 TABLET ORAL at 10:54

## 2017-08-24 RX ADMIN — IBUPROFEN 800 MG: 400 TABLET ORAL at 17:56

## 2017-08-24 RX ADMIN — OXYCODONE HYDROCHLORIDE 5 MG: 5 TABLET ORAL at 17:56

## 2017-08-24 RX ADMIN — OXYCODONE HYDROCHLORIDE 5 MG: 5 TABLET ORAL at 21:42

## 2017-08-24 RX ADMIN — OXYCODONE HYDROCHLORIDE 5 MG: 5 TABLET ORAL at 09:59

## 2017-08-24 RX ADMIN — IBUPROFEN 800 MG: 400 TABLET ORAL at 04:16

## 2017-08-24 RX ADMIN — SENNOSIDES AND DOCUSATE SODIUM 2 TABLET: 8.6; 5 TABLET ORAL at 20:03

## 2017-08-24 RX ADMIN — ACETAMINOPHEN 975 MG: 325 TABLET, FILM COATED ORAL at 13:49

## 2017-08-24 RX ADMIN — SIMETHICONE CHEW TAB 80 MG 80 MG: 80 TABLET ORAL at 17:56

## 2017-08-24 RX ADMIN — OXYCODONE HYDROCHLORIDE 5 MG: 5 TABLET ORAL at 22:15

## 2017-08-24 RX ADMIN — OXYCODONE HYDROCHLORIDE 5 MG: 5 TABLET ORAL at 13:48

## 2017-08-24 RX ADMIN — SENNOSIDES AND DOCUSATE SODIUM 2 TABLET: 8.6; 5 TABLET ORAL at 09:11

## 2017-08-24 RX ADMIN — ACETAMINOPHEN 975 MG: 325 TABLET, FILM COATED ORAL at 03:40

## 2017-08-24 RX ADMIN — ACETAMINOPHEN 975 MG: 325 TABLET, FILM COATED ORAL at 21:41

## 2017-08-24 NOTE — PLAN OF CARE
Problem: Postpartum ( Delivery) (Adult)  Goal: Signs and Symptoms of Listed Potential Problems Will be Absent or Manageable (Postpartum)  Signs and symptoms of listed potential problems will be absent or manageable by discharge/transition of care (reference Postpartum ( Delivery) (Adult) CPG).   Patient up to see  in NICU.  Patient started on oral pain medications due to loss of IV access.  Patient VSS, shanks removed and patient up to shower this am.  Will continue to monitor patient closely and notify MD of any changes in patient status.  Awaiting plan of care for medications from psych team.

## 2017-08-24 NOTE — PLAN OF CARE
Problem: Goal Outcome Summary  Goal: Goal Outcome Summary  Outcome: No Change  VSS. Pain controlled well with current PRN regimen. Postpartum checks WNL. Breastpumping with assist. Pt up out of bed this evening, down to NICU in wheel chair, steady on feet. Sepulveda patent and intact, adequate urine output. PIV saline locked. CMS intact. Dressing clean, dry and intact. Patient requires a lot of reinforcements with education.Tolerating food and meds PO. Continue plan of care.

## 2017-08-24 NOTE — PROGRESS NOTES
Post Partum Progress Note  POD#1    Subjective:  She is resting comfortably in bed this morning. She has no specific complaints this AM. Pain is improving and well controlled on current medication regimen. She is tolerating PO intake. Lochia present and moderate.  Her shanks is in place. She has passed flatus and has not had a BM. She is ambulating without dizziness or difficulty.  She denies headache, changes in vision, nausea/vomiting, chest pain, shortness of breath, RUQ pain, or worsening edema.  Plans to bottle feed.    Objective:   Vitals:    17 0200 17 0300 17 0344 17 0400   BP:   98/55    Resp:       Temp:   98.2  F (36.8  C)    TempSrc:   Oral    SpO2: 97% 98%  98%   Weight:       Height:           General: NAD. A&Ox3.  CV: Regular rate, well perfused.   Pulm: Normal respiratory effort.  Abd: Soft, non-tender, non-distended. Fundus is firm below the umbilicus.    Incision: Pfannenstiel skin incision with bandage in place, no shadowing  Ext: trace lower extremity edema bilaterally. No calf tenderness.    Assessment/Plan:  Jazz Lal is a 26 year old  female who is POD#1 s/p PLTCS at 35w3d for fetus with symmetric IUGR, category II nonreactive FHT, fetal echo demonstrating premature closure of ductus arteriosus and subsequent right heart failure.    - Encourage routine post-operative goals including ambulation and incentive spirometry  - PNC: Rh positive. Rubella immune. No intervention indicated.  - Pain: controlled on oral medications  - Heme: Hgb 11.9>>AM Hgb pending.  If <10 will discharge home with iron.  - GI: continue anti-emetics as needed and stool softeners scheduled.  - : Shanks in place. Plan trial of void this morning.  - Infant: in NICU. Infant with right heart failure on ultrasound: plan to collect TORCH titers today  - Feeding: Plans on bottle feeding.  - History of bipolar disorder: continue PTA zyprexa. Patient's parents request that she be restarted  on Depakote taken prior to pregnancy. Plan inpatient psychiatric evaluation. Plan social work consultation postpartum.     Discharge to home likely on POD#3    Luma Florez MD  OB/GYN Resident, PGY-3  8/24/2017 9:54 AM        Attending Attestation:  The documentation recorded by the scribe accurately reflects the services I personally performed and the decisions made by me.      I spent 15 minutes total face-to-face with this inpatient, and >50% of the time was spent in counseling and/or coordination of care.    Laura Samano, DO  Maternal Fetal Medicine

## 2017-08-24 NOTE — LACTATION NOTE
This note was copied from a baby's chart.  D:  I met with Jazz at the bedside; she had questions about pumping and depakote (which she will resume tomorrow).  I:  She stated she plans to stop pumping tomorrow when starting her depakote, as she was told that it is not safe while nursing.  I provided education that it is compatible per NICU lactation resources, NICU pharmacist, AAP, and NICU MDs (resident spoke with her as well) if she did want to keep pumping and provide milk.  I provided education on health benefits of breast milk for her small  baby beyond nutrition, that it is normal to get small amounts the first few days, and that she could give breast milk and formula together if she chooses.  Offered assistance with pumping and hand expression; she declined.  Reassured mom that our role is to provide support, help and education whatever she chooses to do.  Mom still unsure of her pumping plan.  Dad verbalized that he would like Di to receive mom's milk.    A:  Education and support given; unsure of mom's plan.  Full lactation admission deferred for now.  P:  Will continue to provide lactation support.    Ashly Sr, RNC, IBCLC

## 2017-08-24 NOTE — PLAN OF CARE
Problem: Goal Outcome Summary  Goal: Goal Outcome Summary  Outcome: Improving  [id number]    Patient arrived to [facility] unit via 1500 ,with belongings. Vitals and assessment completed. Independent with self cares. Oriented patient to room, call light, new family folder and plan of care. Will continue with current plan of care.

## 2017-08-24 NOTE — PLAN OF CARE
Problem: Goal Outcome Summary  Goal: Goal Outcome Summary  Outcome: Therapy, progress toward functional goals as expected  VSS. Afebrile. Bleeding is scant. Dressing C/D/I. Uterus firm and midline. Patient was very tired but able to get a decent amount of sleep. Rest was encouraged overnight. Patient used the breast pump as well as manual expression. Making drops of colostrum, brought to NICU on sterile q-tips. Patient has many questions and occasionally asks the same one multiple times. Education reinforced. Patient is currently spending time in the NICU with baby. Will call RN in NICU for an update prior to shift change. PIV access was lost, providers aware. PO pain medications given, patient tolerating well. Continue postpartum cares.

## 2017-08-24 NOTE — PROGRESS NOTES
"Post  Anesthesia Follow Up Note    Patient: Jazz Lal    Patient location: Postpartum floor.    Chief complaint: Acute postoperative pain managment    Procedure(s) Performed:   SECTION    Anesthesia type: Spinal Block  and transversus abdominus plane (TAP) nerve block        Subjective:   The patient reports good pain control.  Pain Intensity: 3/10.  She reports mild pruritus. The patient denies weakness, paresthesia, numbness or tingling lips, tinnitus, metallic taste, difficulties breathing or voiding, nausea or vomiting. She is able to ambulate and tolerates regular diet.        Objective:  Respiratory Function (RR / SpO2 / Airway Patency): Satisfactory    Cardiac Function (HR / Rhythm / BP): Satisfactory    Strength and sensation lower extremities: Strength 5/5 and grossly symmetric bilateral LE    Site of spinal/epidural insertion: clean and intact, No tenderness, swelling or erythema    Last Vitals: BP 98/55  Temp 36.8  C (98.2  F) (Oral)  Resp 18  Ht 1.626 m (5' 4\")  Wt 80.3 kg (177 lb)  SpO2 98%  Breastfeeding? Unknown  BMI 30.38 kg/m2      Assessment and Plan:   Jazz Lal is a 26 year old female POD #1 s/p   SECTION with IT morphine, 0.2 mg and single shot TAP nerve block injections bupivacaine 0.25% with epinephrine 1:200,000 20 mL, then liposome bupivacaine (Exparel) long-acting 1.3% 20mL given bilaterally for postoperative analgesia.  Pt is minimally ambulating given the shanks still in place but denies  weakness or paresthesias.  No evidence of adverse side effects associated with spinal and nerve block injections. Pt is receiving adequate incisional pain control.  Anticipate up to 72 hours of incisional pain control.  Anticipate patient will require opioid/nonopioid analgesics for visceral and muscle pain not controlled with local anesthetic.      - NO other local anesthetic use within 96 hours of liposome bupivacaine (Exparel) long acting  - patient received verbal " and written instructions about liposome bupivacaine   - please call if questions or concerns  - discussed plan with attending anesthesiologist    Watson Plasencia MD  [facility]  2017 8:35 AM    If questions or concerns, please contact OB Anesthesia Resident   Phone [phone]    I have personally seen and evaluated patient. She is doing well post  section.  No complications noted from IT or TAP block. Appreciate opportunity to participate in patient's care.     Toña Veliz MD  2017

## 2017-08-24 NOTE — PROGRESS NOTES
[facility]  MATERNAL CHILD HEALTH   SOCIAL WORK PSYCHOSOCIAL ASSESSMENT      DATA:   Met with patient to assess needs and to offer support.  Patient is 26 year-old Jazz Lal.  FOB is Gatito Gregorio.  Jazz and Gatito have known one another since childhood-they were next door neighbors while growing up.  They are not  but have been in a relationship for over 7 years.  They live together in an apartment in [place], [place].  Di is their second baby together; Their other daughter, Lena is 2.5 years-old.       Jazz's parents have temporary, full legal custody of Lena.  Although the circumstances of this arrangement are not completely clear to me, Jazz explains this as an involuntary transfer of custody with child protection involvement.  Jazz describes a period of instability for sharon and Gatito that was directly related to their mental health status and psychosocial situation.  This matter was handled in family court.  Jazz reports that she and Gatito have been actively working a case plan with child protection and are moving toward reunification with Lena.  Lena is spending time with Jazz expresses hope that Lena will be back in their care and custody in the near future.  She also has the understanding that baby Di will discharge home to her care while they are working on the transition plan for Lena.      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  She plans an 8 week maternity leave and will then resume her part-time work schedule.  Gatito is not currently employed.  Both Jazz and Gatito receive SSI disability benefits on the basis of their mental health diagnoses.  Gatito also has back problems for which he just recently had surgery.      Jazz was diagnosed with Bi Polar Disorder at the age of 14.  She is well-connected with mental health services  "in the community.  She is under the care of a psychiatrist that she sees twice a month for medication management.  She wiill continue her Zyprexa (mood stabilizer) and agrees to the treatment recommendation to re-start her Depakote tomorrow.  Jazz has a therapist that she sees weekly for individual sessions and has been participating in DBT.  She has a mental health  and an [facility] worker that both help her develop and enhance psychiatric stability, social competencies, personal and emotional adjustment, and independent living and community skills.      Jazz has Medical Assistance ([place]-for Employed Persons with Disabilities) and WIC benefits through [facility].  Encouraged Jazz to re-apply for food assistance benefits.  Jazz will bring Di to see Dr. Jazz Maddox at [facility] in [place], [place] for care upon discharge.  She has all essential baby supplies to bring Di home.      Jazz identifies a strong support system that includes Gatito as well as her parents and siblings.  She also identifies a strong connection to her servando-- she states, \"I'm a [employer]er and am [employer]\".  She wears a \"purity ring\" as a symbol of her relationship to God.  She finds comfort in her connection to [employer] and attends weekly services there.        INTERVENTION:   NICU psychosocial assessment completed.   Provided supportive counseling related to Di's NICU admission.      Given Jazz's previous transfer of custody and child protection involvement,  will notify [facility] Child Protection of Di's birth.  Jazz states understanding of this mandated report.      ASSESSMENT:   Jazz is pleasant and is easy to engage.  Her mood is good and affect is bright and appropriate.  She seems motivated to continue her efforts to maintain her current level of stability.   There seems to be some tension in Jazz's relationship with Gatito.  She did not offer details about this during " our visit.  His lack of presence since before delivery leaves question about his support and emotional availability to Jazz.      PLAN:   Social work will continue to follow along throughout Di's NICU admission for needs and for support.

## 2017-08-25 LAB
CMV IGG SERPL QL IA: <0.2 AI (ref 0–0.8)
CMV IGM SERPL QL IA: 0.2 AI (ref 0–0.8)
COPATH REPORT: NORMAL

## 2017-08-25 PROCEDURE — 12000030 ZZH R&B OB INTERMEDIATE UMMC

## 2017-08-25 PROCEDURE — 25000132 ZZH RX MED GY IP 250 OP 250 PS 637: Performed by: OBSTETRICS & GYNECOLOGY

## 2017-08-25 RX ORDER — OLANZAPINE 10 MG/1
10 TABLET ORAL AT BEDTIME
Status: DISCONTINUED | OUTPATIENT
Start: 2017-08-25 | End: 2017-08-26 | Stop reason: HOSPADM

## 2017-08-25 RX ORDER — HYDROXYZINE HYDROCHLORIDE 50 MG/1
50 TABLET, FILM COATED ORAL EVERY 6 HOURS PRN
Status: DISCONTINUED | OUTPATIENT
Start: 2017-08-25 | End: 2017-08-26 | Stop reason: HOSPADM

## 2017-08-25 RX ORDER — HYDROXYZINE HYDROCHLORIDE 25 MG/1
25 TABLET, FILM COATED ORAL EVERY 6 HOURS PRN
Status: DISCONTINUED | OUTPATIENT
Start: 2017-08-25 | End: 2017-08-26 | Stop reason: HOSPADM

## 2017-08-25 RX ADMIN — ACETAMINOPHEN 975 MG: 325 TABLET, FILM COATED ORAL at 06:44

## 2017-08-25 RX ADMIN — OXYCODONE HYDROCHLORIDE 5 MG: 5 TABLET ORAL at 13:47

## 2017-08-25 RX ADMIN — DIVALPROEX SODIUM 500 MG: 500 TABLET, EXTENDED RELEASE ORAL at 23:41

## 2017-08-25 RX ADMIN — IBUPROFEN 800 MG: 400 TABLET ORAL at 13:19

## 2017-08-25 RX ADMIN — OLANZAPINE 10 MG: 10 TABLET, FILM COATED ORAL at 23:41

## 2017-08-25 RX ADMIN — OXYCODONE HYDROCHLORIDE 10 MG: 5 TABLET ORAL at 06:44

## 2017-08-25 RX ADMIN — IBUPROFEN 800 MG: 400 TABLET ORAL at 00:20

## 2017-08-25 RX ADMIN — OLANZAPINE 7.5 MG: 7.5 TABLET, FILM COATED ORAL at 00:20

## 2017-08-25 RX ADMIN — ACETAMINOPHEN 975 MG: 325 TABLET, FILM COATED ORAL at 16:40

## 2017-08-25 RX ADMIN — DIVALPROEX SODIUM 500 MG: 500 TABLET, EXTENDED RELEASE ORAL at 00:20

## 2017-08-25 RX ADMIN — OXYCODONE HYDROCHLORIDE 10 MG: 5 TABLET ORAL at 22:40

## 2017-08-25 RX ADMIN — OXYCODONE HYDROCHLORIDE 5 MG: 5 TABLET ORAL at 13:19

## 2017-08-25 RX ADMIN — SENNOSIDES AND DOCUSATE SODIUM 2 TABLET: 8.6; 5 TABLET ORAL at 19:36

## 2017-08-25 RX ADMIN — IBUPROFEN 800 MG: 400 TABLET ORAL at 19:36

## 2017-08-25 RX ADMIN — OXYCODONE HYDROCHLORIDE 10 MG: 5 TABLET ORAL at 18:30

## 2017-08-25 RX ADMIN — OXYCODONE HYDROCHLORIDE 10 MG: 5 TABLET ORAL at 09:50

## 2017-08-25 RX ADMIN — IBUPROFEN 800 MG: 400 TABLET ORAL at 06:44

## 2017-08-25 RX ADMIN — OXYCODONE HYDROCHLORIDE 10 MG: 5 TABLET ORAL at 03:15

## 2017-08-25 NOTE — PLAN OF CARE
Problem: Goal Outcome Summary  Goal: Goal Outcome Summary  Outcome: Therapy, progress towards functional goals is fair  Vital signs stable. Postpartum assessment WDL. Incision clean, dry, intact and open to air. Pain controlled with tylenol, ibuprofen and oxycodone. Difficult to tell if patient is having adequate pain control. Throughout the night the patient's significant other, Gatito, has been calling out for patient when pain medications were needed. When this RN would ask the patient about her pain in the room, the significant other would answer the questions. This RN had to ask questions multiple times and in different ways before patient would answer herself.   Patient ambulating independently with encouragement; patient needs reminding about the importance of ambulation after surgery.  Patient voiding independently and passing gas. Patient pumping every 2-3 hours. Patient visited infant in NICU every 3-4 hours overnight.   As noted earlier with medications, significant other seemed to be very agitated overnight and tension observed between patient and significant other. Patient also needs a lot of reinforcement and repetition with teaching and plan of care. She is very eager to learn, but asks the same questions multiple times and seems to have difficulty understanding. Will continue with current plan of care and help towards discharge goals.

## 2017-08-25 NOTE — PROGRESS NOTES
Post Partum Progress Note  POD#3    Subjective:  She is resting comfortably in bed this morning. She has no complaints this AM. Pain is improving and well controlled on current medication regimen. She is tolerating PO intake. Lochia present and moderate.  She is voiding without difficulty. She has passed flatus. She is ambulating without dizziness or difficulty.  She denies headache, changes in vision, nausea/vomiting, chest pain, shortness of breath, RUQ pain, or worsening edema.  Plans to bottle feed.    Objective:   Vitals:    17 0940 17 1500 17 0025 17 0751   BP: 118/63 (!) 129/94 104/64 116/78   BP Location:    Left arm   Resp: 18 18 18 18   Temp: 97.9  F (36.6  C) 97.6  F (36.4  C) 97.8  F (36.6  C) 97.6  F (36.4  C)   TempSrc: Oral Oral Oral Oral   SpO2:    97%   Weight:       Height:         General: NAD. A&Ox3.  CV: Regular rate, well perfused.   Pulm: Normal respiratory effort.  Abd: Soft, non-tender, non-distended. Fundus is firm and below the umbilicus.    Incision: pfannenstiel skin incision is clean, dry, intact  Ext: lower extremity edema bilaterally. No calf tenderness.    Assessment/Plan:  Jazz Lal is a 26 year old  female who is POD#3 s/p PLTCS at 35w3d for fetus with symmetric IUGR, category II nonreactive FHT, fetal echo demonstrating premature closure of ductus arteriosus and subsequent right heart failure.     - Encourage routine post-operative goals including ambulation and incentive spirometry  - PNC: Rh positive. Rubella immune. No intervention indicated.  - Pain: controlled on oral medications  - Heme: Hgb 11.9>>10.5. Vital signs stable.  - GI: continue anti-emetics as needed and stool softeners scheduled.  - : Voiding spontaneously  - Infant: in NICU. Infant with right heart failure on ultrasound. TORCH titers collected and pending.   - Feeding: Plans on bottle feeding.  - History of bipolar disorder: continue PTA zyprexa - increase to 10 mg  yesterday went well. Started on Depakote 500 mg QHS yesterday. Plan very close outpatient follow up with primary psychiatrist. S/p SW consultation postpartum.     Discharge to home likely on POD#3 or 4    Luma Florez MD  OB/GYN Resident, PGY-3  8/25/2017 6:55 AM

## 2017-08-25 NOTE — LACTATION NOTE
"D:  I met with Jazz; Di is her 2nd baby.  She tried a few days to nurse her 1st; she \"wouldn't latch and I didn't have any milk\".  She has bipolar, takes zyprexa, depakote, atarax, and has no history of breast/chest surgery or trauma.  She has already started to pump and just pumped 20ml.  She plans short term pumping.   I:  I gave her a folder of introductory materials and went over pumping guidelines.  I reviewed physiology of colostrum and milk production, pumping guidelines, and I gave her a log and encouraged her to use it.   I explained how to access the videos \"Hand Expression\" and \"Maximizing Milk Production\"; as well as other helpful books and websites.   We discussed hands-on pumping techniques and usefulness of a hands-free pumping bra.  We discussed skin to skin holding and how to reach your breastfeeding goals.  We talked about  medications during breastfeeding.  I showed her how to use both Initiate and Maintain settings on the pump and talked about how to make a hands-free pumping bra.  We talked about benefits of breast milk beyond nutrition.  We talked about how to fit pumping in around her psychiatric needs (for example, if her meds make it difficult to wake at night she could skip nighttime pumping, but that doing so would impact supply, so she would need to weigh the pros and cons, with her personal mental health being a priority).  We talked about getting a pump through insurance; she was given a pump with her last child (2.5 years ago) but no longer has it; I encouraged her to call insurance to see if they would cover another pump, or another option would be to only use the hospital pumps (she plans to board in after discharge).  A:  Mom has information she needs to initiate her supply; plans short term pumping.   P:  Will continue to provide lactation support.  Ashly Sr, RNC, IBCLC       "

## 2017-08-25 NOTE — PROGRESS NOTES
Received a call from patient's mother, Kassy Lal at 1946. Kassy was concerned that Jazz would not take Depakote tonight because it may pass through breast milk to the infant. Kassy also stated she has been raising the Jazz's other daughter and has custody. Kassy stressed that she wanted her daughter to be successful with raising this child and that if she did not take her Depakot and get enough sleep then this success could be compromised. This nurse informed Kassy that she could not pass on any medical information about her daughter. Kassy said she understood, but just wanted us to be aware of the situation. She asked if social work could contact her tomorrow and that she understood they would not pass any medical information about Jazz's stay. This nurse informed Kassy that she would pass on her contact information to social work. This nurse also discussed re-starting the Depakote with Jazz, she is agreeable to taking it tonight before bed.

## 2017-08-25 NOTE — CONSULTS
Patient seen and chart reviewed. Formal consult dictated.(initial)  Please increase Zyprexa to 10mg at bedtime    Paul Bowen MD

## 2017-08-25 NOTE — PROGRESS NOTES
Post Partum Progress Note  POD#2    Subjective:  She is resting comfortably in bed this morning. She has no complaints this AM. Pain is improving and well controlled on current medication regimen. She is tolerating PO intake. Lochia present and moderate.  She is voiding without difficulty. She has passed flatus. She is ambulating without dizziness or difficulty.  She denies headache, changes in vision, nausea/vomiting, chest pain, shortness of breath, RUQ pain, or worsening edema.  Plans to bottle feed.  Mood is stable.    Objective:   Vitals:    17 0344 17 0400 17 0940 17 1500   BP: 98/55  118/63 (!) 129/94   Resp:   18 18   Temp: 98.2  F (36.8  C)  97.9  F (36.6  C) 97.6  F (36.4  C)   TempSrc: Oral  Oral Oral   SpO2:  98%     Weight:       Height:           General: NAD. A&Ox3.  CV: Regular rate, well perfused.   Pulm: Normal respiratory effort.  Abd: Soft, non-tender, non-distended. Fundus is firm and below the umbilicus.    Incision: pfannenstiel skin incision is clean, dry, intact  Ext: lower extremity edema bilaterally. No calf tenderness.    Assessment/Plan:  Jazz Lal is a 26 year old  female who is POD#2 s/p PLTCS at 35w3d for fetus with symmetric IUGR, category II nonreactive FHT, fetal echo demonstrating premature closure of ductus arteriosus and subsequent right heart failure.     - Encourage routine post-operative goals including ambulation and incentive spirometry  - PNC: Rh positive. Rubella immune. No intervention indicated.  - Pain: controlled on oral medications  - Heme: Hgb 11.9>>10.5. Vital signs stable.  - GI: continue anti-emetics as needed and stool softeners scheduled.  - : Voiding spontaneously  - Infant: in NICU. Infant with right heart failure on ultrasound. TORCH titers collected and pending.   - Feeding: Plans on bottle feeding.  - Psych:   - History of bipolar disorder: continue PTA zyprexa. Started on Depakote 500 mg QHS yesterday.   -  History of postpartum psychosis.    - Inpatient psychiatric consultation pending, appreciate recommendations. Plan very close outpatient follow up with primary psychiatrist. S/p SW consultation postpartum.     Discharge to home likely on POD#3    Luma Florez MD  OB/GYN Resident, PGY-3  8/25/2017 6:55 AM      Attending Attestation:     I agree with the residents note above.  Records received with primary Psych recommendations to restart depakote at 500 mg.  Plan to follow-up with primary OB/family med in 1 week for mood check in (Dr. Maddox aware and will plan to see her in their office).    I spent 15 minutes total face-to-face with this inpatient, and >50% of the time was spent in counseling and/or coordination of care.    Laura Samano, DO  Maternal Fetal Medicine

## 2017-08-25 NOTE — CONSULTS
"PSYCHIATRIC CONSULTATION       IDENTIFICATION:  Ms. Jazz Lal is a 26-year-old white female, now mother of 2, who recently delivered her second baby girl.  I am asked to evaluate her psychiatric status by Dr. Samano.      CHIEF COMPLAINT:  Bipolar.      HISTORY OF PRESENT ILLNESS:  Ms. Lal presents with euthymic mood and seems quite happy that her baby's medical status is improving.  However, on closer interview, she is somewhat defensive and disorganized.  From what she tells me, she had a \"psychotic break\" at age 14.  She tells me this was associated with a temperature of 106, perhaps she had an episode of catatonic excitement, but apparently she has had multiple psychiatric hospitalizations and emergency room visits since that time.  On my interview, she tended to underestimate her recent psychiatric issues.  She tells me that she had a few hospitalizations while in college that she cannot remember and that recently she was \"electrocuted 4 times by police\" by that she means she was tasered, put in intermediate and then ended up in a psychiatric unit.  I actually do not find records of that in Care Everywhere, but reviewing Care Everywhere, I note that she has had various emergency room visits with manic type symptoms and also note that in 2016 there is a note suggesting that she was on a provisional discharge from previous commitment and presented again with enrique.  The chart also documents a number of episodes of conflict between the patient and her parents.  Apparently the parents currently have custody of the 2-year-old and the patient will be taking care of her .  From what I can tell, looking at the notes, Child Protection is involved and the patient is being monitored.      During my interview, the patient reports that she has been in DBT and will continue with a DBT therapist.  She also has an [facility] worker and a regular psychiatrist.  It does appear that she has an excellent outpatient plan.  She has " been started back on Depakote 500 with the intention of doubling it in 2 weeks.  This will be under the care of her outpatient psychiatrist.  Currently, the patient does seem to be under reasonable control with a very good outpatient treatment plan; however, given the severity of recent manic episodes, I would recommend increasing her Zyprexa to 10 mg and leaving it at 10 until her Depakote gets to its therapeutic range.      PAST MEDICAL HISTORY:  Includes her bipolar disorder and wisdom tooth extraction as well as the delivery 2 years ago.      ALLERGIES:  To bees and gets anaphylaxis and hives.      FAMILY HISTORY:  The patient reports her father is bipolar and there is bipolar on the maternal side as well.      SOCIAL HISTORY:  The patient reports that she does not smoke cigarettes, abuse drugs or alcohol.  She lives with her significant other who is the father of both children.  Her parents are taking care of her 2-year-old.      PHYSICAL REVIEW OF SYSTEMS:  On my interview, the patient denied headache or problems with vision or hearing.  She denied chest pain, shortness of breath.  She has some discomfort from her , but otherwise denies abdominal pain.  She has had constipation.  She denied genitourinary symptoms or problems with muscles, skin or joints.      MENTAL STATUS EXAMINATION:  On my interview, the patient was a pleasant, cooperative white female.  Her mood was good.  Her affect was full and appropriate.  Her speech was coherent and generally goal oriented.  Her associations were usually tight, but she did become somewhat vague when describing her psychiatric history.  Content of thought was without current psychosis or suicidal ideation.  Recent memory seems intact.  She does have a good deal of difficulty remembering previous psychiatric hospitalizations.  Reports she really cannot remember what happened during her hospitalizations in college years.  Concentration, fund of knowledge and use  of language were within normal limits.  She is alert and oriented x3.  Insight and judgment are currently intact.  Muscle strength and tone appear to be at baseline and recent vitals include a temperature of 97.6, heart rate of 86, respiration rate of 18 with 97% oxygen saturation and blood pressure of 116/78.      ASSESSMENT:  Bipolar affective disorder in the past with psychotic features, currently euthymic.      RECOMMENDATIONS:  Increase Zyprexa to 10 mg p.o. each night at bedtime while titrating Depakote.  I agree with her outpatient psychiatrist's Depakote protocol.  I asked the patient to make an appointment with her psychiatrist as soon as possible and to continue with her DBT therapist, [facility] worker, etc.         JENNIFER SHAH MD             D: 2017 16:33   T: 2017 16:54   MT:       Name:     KEVIN SAMANIEGO   MRN:      [id number]        Account:       [id number]   :      1990           Consult Date:  2017      Document: [id number]

## 2017-08-25 NOTE — PLAN OF CARE
Problem: Goal Outcome Summary  Goal: Goal Outcome Summary  Outcome: No Change  Pt A&O, VSS. Incision CDI, fundus firm and midline.  Pt voiding well.  Pain well managed with ibuprofen, tylenol, and oxycodone. Pt independent in room and visiting NICU.  Will continue to monitor.  Data: Vital signs within normal limits. Postpartum checks within normal limits - see flow record. Patient eating and drinking normally. Patient able to empty bladder independently and is up ambulating. No apparent signs of infection. Incision healing well. Patient performing self cares and is able to care for infant.  Action: Patient medicated during the shift for pain. See MAR. Patient reassessed within 1 hour after each medication and pain was improved - patient stated she was comfortable. Patient education done. See flow record.  Plan: Anticipate discharge to boarding room tomorrow.

## 2017-08-25 NOTE — PROGRESS NOTES
Follow-up visit with Jazz this morning.  [facility] has spoken with Jazz and confirms plan for baby Di to discharge home to Jazz's and Gatito's care.      [facility] has offered Jazz voluntary services with their Parent Outreach Support Program ([facility]) This will bring a PHN and a SW to their home for support and connection to resources.  They can also provide ongoing assessment of Jazz's overall mental health status and coping.  Jazz verbally consented to these services today and [facility] will contact Jazz after Di is discharged to home.      After her previous delivery, Jazz experienced an acute mental health crisis that required inpatient psychiatric admission.  Social work continues to reinforce with Ursula the importance of continuing her mental health medications now.  She requires these medications for stable mental health and for effective coping and parenting.  Discussed the critical importance of protected sleep for Jazz as sleep deprivation is a precursor to mood instability for her.   Urged Jazz and Gatito to discuss strategies to share Di's cares so that both can demonstrate competency in caring for their  but balance this with necessary rest and self-care.      Provided Jazz with a one week parking pass to avoid her parking expenses.  Encouraged Jazz to have family bring her food and clothing from home while she is boarding.      SW will continue to follow.

## 2017-08-26 VITALS
OXYGEN SATURATION: 97 % | SYSTOLIC BLOOD PRESSURE: 123 MMHG | HEIGHT: 64 IN | DIASTOLIC BLOOD PRESSURE: 71 MMHG | WEIGHT: 177 LBS | TEMPERATURE: 97.8 F | BODY MASS INDEX: 30.22 KG/M2 | RESPIRATION RATE: 16 BRPM

## 2017-08-26 PROCEDURE — 25000132 ZZH RX MED GY IP 250 OP 250 PS 637: Performed by: OBSTETRICS & GYNECOLOGY

## 2017-08-26 RX ORDER — DIVALPROEX SODIUM 500 MG/1
500 TABLET, EXTENDED RELEASE ORAL AT BEDTIME
Qty: 30 TABLET | Refills: 1 | Status: SHIPPED | OUTPATIENT
Start: 2017-08-26

## 2017-08-26 RX ORDER — OXYCODONE AND ACETAMINOPHEN 5; 325 MG/1; MG/1
1-2 TABLET ORAL EVERY 4 HOURS PRN
Qty: 30 TABLET | Refills: 0 | Status: SHIPPED | OUTPATIENT
Start: 2017-08-26

## 2017-08-26 RX ORDER — OLANZAPINE 10 MG/1
10 TABLET ORAL AT BEDTIME
Qty: 30 TABLET | Refills: 1 | Status: SHIPPED | OUTPATIENT
Start: 2017-08-26

## 2017-08-26 RX ORDER — AMOXICILLIN 250 MG
1-2 CAPSULE ORAL 2 TIMES DAILY PRN
Qty: 60 TABLET | Refills: 1 | Status: SHIPPED | OUTPATIENT
Start: 2017-08-26

## 2017-08-26 RX ORDER — IBUPROFEN 600 MG/1
600 TABLET, FILM COATED ORAL EVERY 6 HOURS PRN
Qty: 30 TABLET | Refills: 0 | Status: SHIPPED | OUTPATIENT
Start: 2017-08-26

## 2017-08-26 RX ADMIN — IBUPROFEN 800 MG: 400 TABLET ORAL at 04:47

## 2017-08-26 RX ADMIN — OXYCODONE HYDROCHLORIDE 10 MG: 5 TABLET ORAL at 04:51

## 2017-08-26 RX ADMIN — OXYCODONE HYDROCHLORIDE 10 MG: 5 TABLET ORAL at 13:09

## 2017-08-26 RX ADMIN — ACETAMINOPHEN 975 MG: 325 TABLET, FILM COATED ORAL at 08:59

## 2017-08-26 RX ADMIN — OXYCODONE HYDROCHLORIDE 10 MG: 5 TABLET ORAL at 01:32

## 2017-08-26 RX ADMIN — SENNOSIDES AND DOCUSATE SODIUM 2 TABLET: 8.6; 5 TABLET ORAL at 08:58

## 2017-08-26 RX ADMIN — ACETAMINOPHEN 975 MG: 325 TABLET, FILM COATED ORAL at 01:25

## 2017-08-26 RX ADMIN — OXYCODONE HYDROCHLORIDE 10 MG: 5 TABLET ORAL at 08:59

## 2017-08-26 RX ADMIN — IBUPROFEN 800 MG: 400 TABLET ORAL at 13:10

## 2017-08-26 NOTE — PLAN OF CARE
Problem: Goal Outcome Summary  Goal: Goal Outcome Summary  Outcome: Improving  Pt stable this shift. Ambulating independently. Pumping and walking to NICU frequently. Pt's incision is intact with steri strips, some dried drainage noted. Bruising below incision. Pain adequately controled with ibuprofen, Tylenol and oxycodone. Pain medications discussed at length with patient. Pt attended discharge class and will d/c to NICU boarding room when meds available.

## 2017-08-26 NOTE — PLAN OF CARE
Problem: Goal Outcome Summary  Goal: Goal Outcome Summary  Outcome: Improving  Post partum assessment WNLs. Incision pain and cramping managed with Oxycodone 10 mg X 2 .Ibuprofen 800 mg  and scheduled Tylenol. Incision CDI. Up ad cristhian, Denies nausea, headache, dizziness, chest pain or SOB.  impendent with ADLs. Pumping and going down to NICU. Significant other at bedside, supportive. Stable post C section patient, resting between visits to NICU. Continue with plan of care.

## 2017-08-26 NOTE — PROGRESS NOTES
Pt attended discharge class. Reviewed discharge instructions at length with patient in room. Pt had many questions, which were answered to her satisfaction. Medications reviewed at length with patient and all questions answered. Pt discharged to NICU boarding room.

## 2017-08-26 NOTE — PLAN OF CARE
Problem: Goal Outcome Summary  Goal: Goal Outcome Summary  Outcome: Improving  VSS. Postpartum assessment WNL. C/o some incisional pain, controlled with tylenol, ibuprofen, oxycodone, and abdominal binder. Pt pumping independently and ambulating to NICU. Encouraged pt to watch educational videos. Significant other present and attentive.

## 2017-09-13 LAB — LAB SCANNED RESULT: NORMAL

## 2019-03-18 ENCOUNTER — RECORDS - HEALTHEAST (OUTPATIENT)
Dept: LAB | Facility: CLINIC | Age: 29
End: 2019-03-18

## 2019-03-18 LAB
ALBUMIN SERPL-MCNC: 3.3 G/DL (ref 3.5–5)
ALP SERPL-CCNC: 64 U/L (ref 45–120)
ALT SERPL W P-5'-P-CCNC: 10 U/L (ref 0–45)
ANION GAP SERPL CALCULATED.3IONS-SCNC: 7 MMOL/L (ref 5–18)
AST SERPL W P-5'-P-CCNC: 12 U/L (ref 0–40)
BILIRUB SERPL-MCNC: 0.2 MG/DL (ref 0–1)
BUN SERPL-MCNC: 8 MG/DL (ref 8–22)
CALCIUM SERPL-MCNC: 9.1 MG/DL (ref 8.5–10.5)
CHLORIDE BLD-SCNC: 106 MMOL/L (ref 98–107)
CHOLEST SERPL-MCNC: 187 MG/DL
CO2 SERPL-SCNC: 27 MMOL/L (ref 22–31)
CREAT SERPL-MCNC: 0.73 MG/DL (ref 0.6–1.1)
FASTING STATUS PATIENT QL REPORTED: ABNORMAL
GFR SERPL CREATININE-BSD FRML MDRD: >60 ML/MIN/1.73M2
GLUCOSE BLD-MCNC: 91 MG/DL (ref 70–125)
HDLC SERPL-MCNC: 54 MG/DL
LDLC SERPL CALC-MCNC: 102 MG/DL
POTASSIUM BLD-SCNC: 4.4 MMOL/L (ref 3.5–5)
PROT SERPL-MCNC: 6.3 G/DL (ref 6–8)
SODIUM SERPL-SCNC: 140 MMOL/L (ref 136–145)
TRIGL SERPL-MCNC: 154 MG/DL

## 2019-06-05 ENCOUNTER — RECORDS - HEALTHEAST (OUTPATIENT)
Dept: LAB | Facility: CLINIC | Age: 29
End: 2019-06-05

## 2019-06-09 LAB — BACTERIA SPEC CULT: ABNORMAL

## 2020-07-31 ENCOUNTER — COMMUNICATION - HEALTHEAST (OUTPATIENT)
Dept: SCHEDULING | Facility: CLINIC | Age: 30
End: 2020-07-31

## 2020-10-30 ENCOUNTER — RECORDS - HEALTHEAST (OUTPATIENT)
Dept: LAB | Facility: CLINIC | Age: 30
End: 2020-10-30

## 2020-10-30 LAB
HCG SERPL QL: NEGATIVE
PROLACTIN SERPL-MCNC: 131.8 NG/ML (ref 0–20)
TSH SERPL DL<=0.005 MIU/L-ACNC: 2.08 UIU/ML (ref 0.3–5)

## 2021-05-31 VITALS — HEIGHT: 64 IN | WEIGHT: 175 LBS | BODY MASS INDEX: 29.88 KG/M2

## 2021-06-12 NOTE — H&P
Date: 2017  Time: 6:35 PM    Admission H&P  Jazz Lal,  1990, MRN [id number]    [facility]  There are no admission diagnoses documented for this encounter.    PCP: Jazz Escoto) Jessica Maddox MD, [phone]          Extended Emergency Contact Information  Primary Emergency Contact: Leodan Lal   [facility]  Home Phone: [phone]  Relation: Father  Secondary Emergency Contact: Gurinder Lalie   [place]  Mobile Phone: [phone]  Relation: Mother       Chief Complaint: Non-reassuring fetal heart tones complicating pregnancy, antepartum       OBSTETRICAL / DATING HISTORY:  Estimated Date of Delivery: Estimated Date of Delivery: 17  Gestational Age: 34w5d    OB History    Para Term  AB Living   2 1 1 0 0 1   SAB TAB Ectopic Multiple Live Births   0 0 0 0 1      # Outcome Date GA Lbr Wilner/2nd Weight Sex Delivery Anes PTL Lv   2 Current            1 Term 10/05/14 41w0d 08:30 / 00:13 7 lb 5 oz (3.317 kg) F Vag-Spont Local N IDALIA              HPI:    Pt is 27 yo  at 34+5 weeks GA by LMP confirmed with early ultrasound admitted for observation for IUGR with nonreassuring BPP with NST.  IUGR diagnosed at L2 U/S during evaluation for medications and history of Bipolar.  Pt had L2 U/S done with MPP.  Futher consult done with [facility]. She has been monitored with twice weekly BPP with NST.  2 weeks ago pt did have NST that had a decel present and pt was monitored in hospital, did receive 2 doses of betamethasone and was discharged.  Today  Had BPP 8/8 with NST that was nonreactive, no accels and decels present.   Pt recommended for admission for 24 hour monitoring.   Following admission, there was a deceleration in fetal heart tones lasting approx 2 min.    Subsequently FHT's have returned to baseline, with intermittent variable decelerations.  Patient reports she is feeling well, no pain, no contractions . Her biggest concern at the  "moment is returning to work tomorrow.                Allergies   Allergen Reactions     Bee Venom Protein (Honey Bee) Hives and Swelling     Swelling of throat      Prescriptions Prior to Admission   Medication Sig Dispense Refill Last Dose     acetaminophen (TYLENOL) 325 MG tablet Take 650 mg by mouth every 6 (six) hours as needed for pain.   Unknown at Unknown time     hydrOXYzine (VISTARIL) 25 MG capsule Take 25-50 mg by mouth at bedtime as needed for anxiety.   Unknown at Unknown time     lanolin (LANSINOH HPA) 100 % Oint Apply  Prn for sore nipples   Unknown at Unknown time     OLANZapine (ZYPREXA) 10 MG tablet Take 7.5 mg by mouth at bedtime.    Unknown at Unknown time     ondansetron (ZOFRAN) 4 MG tablet Take 4 mg by mouth every 8 (eight) hours as needed for nausea.   Unknown at Unknown time     PRENATAL VIT,ANTONIO 74/IRON/FOLIC (PRENATAL VITAMIN 1+1 ORAL) Take 1 tablet by mouth daily.   Unknown at Unknown time           Physical Exam:  Temp:  [98.4  F (36.9  C)] 98.4  F (36.9  C)  Heart Rate:  [116] 116  Resp:  [20] 20  BP: (122)/(75) 122/75    /75 (Patient Position: Lying, Cuff Size: Adult Regular)  Pulse (!) 116  Temp 98.4  F (36.9  C) (Oral)   Resp 20  Ht 5' 4\" (1.626 m)  Wt 175 lb (79.4 kg)  BMI 30.04 kg/m2    Head: Normocephalic, without obvious abnormality, atraumatic, Eyes:  normal conjunctiva, Heart:  regular rate and rhythm, Lungs:  normal respirations, Abdomen:  Gravid, not tressa and Extremities:  extremities normal, atraumatic, no cyanosis or edema                          Membrane Status: Membrane Status:   intact  Rupture Date:    Fluid color:    Fluid odor:    Fluid Amount:       Cervical Exam: Dilation  cm  Effacement  %  Cervical characteristics    Station    Presentation @FLOW(48029)Cervical characteristics )   Station    Presentation @FLOW(02700)Fetal PresentationFetal Heart FHR Category: )      Uterine Activity: Mode: Monitor Mode: External  Contraction frequency: " Contraction Frequency (min): none                 Pertinent Labs  Lab Requisition on 07/20/2017   Component Date Value Ref Range Status     Syphilis Screen Cascade 07/20/2017 Non-Reactive  Non-Reactive Final   Lab Requisition on 03/31/2017   Component Date Value Ref Range Status     Chlamydia trachomatis, Amplified D* 03/31/2017 Negative  Negative Final     Neisseria gonorrhoeae, Amplified D* 03/31/2017 Negative  Negative Final     Case Report 03/31/2017    Final                    Value:Gynecologic Cytology Report                       Case: [id number]                                   Authorizing Provider:  Jazz Maddox MD         Collected:           03/31/2017 1429              First Screen:          BRYSON Valencia (ASCP)   Received:            04/03/2017 1153              Specimen:    SUREPATH PAP, SCREENING, Endocervical/cervical                                              Interpretation 03/31/2017 Negative for squamous intraepithelial lesion or malignancy   Final     Result Flag 03/31/2017 Normal  Normal Final     Specimen Adequacy 03/31/2017 Satisfactory for evaluation, endocervical/transformation zone component present   Final     HPV Reflex? 03/31/2017 Yes if Abnormal   Final     HIGH RISK 03/31/2017    Final                    Value:No     LMP/Menopause Date 03/31/2017    Final                    Value:12/18/2016     Abnormal Bleeding 03/31/2017    Final                    Value:No     Pt Status 03/31/2017    Final                    Value:pregnant     Birth Control/Hormones 03/31/2017    Final                    Value:None     Previous Normal/Date 03/31/2017    Final                    Value:3/3/14     Prev Abn Date/Dx 03/31/2017    Final                    Value:none     Cervical Appearance 03/31/2017    Final                    Value:normal   Lab Requisition on 03/28/2017   Component Date Value Ref Range Status     HIV Antigen / Antibody 03/28/2017 Negative  Negative Final     Hepatitis B Surface  Ag 03/28/2017 Negative  Negative Final     HML Antibody Screen 03/28/2017 Negative  Negative Final     HML ABO/Rh Typing 03/28/2017 AB POS   Final     HML ABO/Rh Repeat Typing 03/28/2017 AB POS   Final     Syphilis Screen Cascade 03/28/2017 Non-Reactive  Non-Reactive Final     Rubella IgG 03/28/2017 Immune  Immune Final     WBC 03/28/2017 7.5  4.0 - 11.0 thou/uL Final     RBC 03/28/2017 3.84  3.80 - 5.40 mill/uL Final     Hemoglobin 03/28/2017 12.1  12.0 - 16.0 g/dL Final     Hematocrit 03/28/2017 36.2  35.0 - 47.0 % Final     MCV 03/28/2017 94  80 - 100 fL Final     MCH 03/28/2017 31.5  27.0 - 34.0 pg Final     MCHC 03/28/2017 33.4  32.0 - 36.0 g/dL Final     RDW 03/28/2017 13.0  11.0 - 14.5 % Final     Platelets 03/28/2017 265  140 - 440 thou/uL Final     MPV 03/28/2017 10.0  8.5 - 12.5 fL Final     Neutrophils % 03/28/2017 70  50 - 70 % Final     Lymphocytes % 03/28/2017 24  20 - 40 % Final     Monocytes % 03/28/2017 5  2 - 10 % Final     Eosinophils % 03/28/2017 1  0 - 6 % Final     Basophils % 03/28/2017 0  0 - 2 % Final     Neutrophils Absolute 03/28/2017 5.2  2.0 - 7.7 thou/uL Final     Lymphocytes Absolute 03/28/2017 1.8  0.8 - 4.4 thou/uL Final     Monocytes Absolute 03/28/2017 0.4  0.0 - 0.9 thou/uL Final     Eosinophils Absolute 03/28/2017 0.1  0.0 - 0.4 thou/uL Final     Basophils Absolute 03/28/2017 0.0  0.0 - 0.2 thou/uL Final   Lab Requisition on 01/14/2017   Component Date Value Ref Range Status     Beta-hCG, Quantitative 01/14/2017 2061* 0 - 4 mlU/mL Final   Lab Requisition on 01/11/2017   Component Date Value Ref Range Status     Beta-hCG, Quantitative 01/11/2017 873* 0 - 4 mlU/mL Final   Lab Requisition on 01/05/2017   Component Date Value Ref Range Status     Beta hCG Qualitative 01/05/2017 Positive* Negative Final   Lab Requisition on 12/05/2016   Component Date Value Ref Range Status     Valproic Acid 12/05/2016 75.6  50.0 - 150.0 ug/mL Final      Recommended therapeutic trough conc range when  used  for manic episodes associated with bipolar disorder:   ug/ml.                 Impression:  Jazz Lal is a 26 y.o. year old at 34+5 weeks with IUGR, weight less than 10%tile. Clinic BPP today  with nonreassuring NST, now with recurrent decels, one down to 60's, the remainder appear to be variable decels.     Patient is unknown GBS Status.   Pt has received antepartum steroids in the last 2 weeks.     Plan:  Appreciate OB/GYN consultation.  monitoring x 24 hours  Fluid flush  Continue home medications including zyprexa  Vistaril for sleep  GBS status pending.    May repeat BPP if continued decels or nonreassuring FHT's.      Provider: Jazz Maddox MD (Betsy)      Date: 2017  Time: 6:35 PM    Jazz Lal,  1990, MRN [id number]

## 2021-06-12 NOTE — PROGRESS NOTES
OB  IUGR WITH NONREACTIVE NST  OCT  NEG  U/S:  BPP 8/8           JAYCE  7.2 -> 8           DOPPLER STUDIES:  3.3  OK TO WATCH AT HOME  RTC 8/22

## 2021-06-12 NOTE — CONSULTS
CONSULTATION - OB    NAME:Jazz Lal  : 1990  MRN: [id number]  Gestational Age: 34w5d     Admission Date: 17    PCP:  Jazz Maddox MD (Betsy)     CHIEF COMPLAINT: non reactive NST    HPI: We have been asked by Dr Maddox to evaluate Jazz Lal who is a 26 y.o. year old female for a non-reactive NST.  History is obtained through the patient and family along with Dr Maddox. She has been having bi-weekly BPPs and NSTs because of symmetric IUGR, <10% on the most recent growth scan.  She had a BPP that was 8/8 today but the NST was non-reactive with some variable decelerations, so she was sent to Labor and Delivery for further evaluation.  The Dopplers were normal.  She received beta methasone in the last 2 weeks.     OB History    Para Term  AB Living   2 1 1 0 0 1   SAB TAB Ectopic Multiple Live Births   0 0 0 0 1      # Outcome Date GA Lbr Wilner/2nd Weight Sex Delivery Anes PTL Lv   2 Current            1 Term 10/05/14 41w0d 08:30 / 00:13 7 lb 5 oz (3.317 kg) F Vag-Spont Local N IDALIA          Diagnoses Complicating pregnancy    Symmetric IUGR  Bipolar disorder  Fetus with absent left kidney    PMH:  Past Medical History:   Diagnosis Date     Mental disorder        PSH:  No past surgical history on file.    Social History:  Social History     Social History     Marital status: Single     Spouse name: N/A     Number of children: N/A     Years of education: N/A     Occupational History     Not on file.     Social History Main Topics     Smoking status: Never Smoker     Smokeless tobacco: Never Used     Alcohol use No     Drug use: No     Sexual activity: Yes     Partners: Male     Other Topics Concern     Not on file     Social History Narrative       Medications:  No current facility-administered medications on file prior to encounter.      Current Outpatient Prescriptions on File Prior to Encounter   Medication Sig Dispense Refill     acetaminophen (TYLENOL) 325 MG tablet Take 650  "mg by mouth every 6 (six) hours as needed for pain.       hydrOXYzine (VISTARIL) 25 MG capsule Take 25-50 mg by mouth at bedtime as needed for anxiety.       lanolin (LANSINOH HPA) 100 % Oint Apply  Prn for sore nipples       OLANZapine (ZYPREXA) 10 MG tablet Take 7.5 mg by mouth at bedtime.        ondansetron (ZOFRAN) 4 MG tablet Take 4 mg by mouth every 8 (eight) hours as needed for nausea.       PRENATAL VIT,ANTONIO 74/IRON/FOLIC (PRENATAL VITAMIN 1+1 ORAL) Take 1 tablet by mouth daily.         Allergies:  Allergies   Allergen Reactions     Bee Venom Protein (Honey Bee) Hives and Swelling     Swelling of throat       Review of Systems   Negative except what is stated in the HPI    Physical exam:  /75 (Patient Position: Lying, Cuff Size: Adult Regular)  Pulse (!) 116  Temp 98.4  F (36.9  C) (Oral)   Resp 20  Ht 5' 4\" (1.626 m)  Wt 175 lb (79.4 kg)  BMI 30.04 kg/m2   General Appearance: Alert, appropriate appearance for age. No acute distress,   HEENT Exam: Grossly normal   Gastrointestinal Exam: gravid  Fetal Heart Tones: baseline 150, category 2  CONTRACTIONS:  none  CERVIX: not examined  Neurologic Exam: Normal speech, no tremor  Psychiatric Exam: Alert and oriented, appropriate affect.    Impression:  26 you  at 34.5 weeks gestation with IUGR and a non reactive NST.    Recommendations:  Will monitor closely.  IV started.  Consider repeat BPP as needed.    Sarika Paz     Cc: Jazz Maddox    "

## 2021-06-12 NOTE — PROGRESS NOTES
Talked with Dr Garcia about positive CST as the patients baceline is 140 and there is 1 prolonged decels down to 115 and then up to baseline after 4 minutes with vairiables also. Sent for a BPP

## 2021-06-12 NOTE — PROGRESS NOTES
Assessment & Plan   at 34+6 weeks GA with IUGR admitted for observation for nonreassuring NST.  Continued minimal variability on fetal heart tracing with small variable decelerations.  BPP 8 on repeat on .    1.  Appreciate OB/GYN consultation.  Will proceed with oxytocin challenge test to assess fetal wellbeing.  2. If normal OCT will send home with continued close outpatient monitoring.    Subjective  Felt well overnight.  Pt denies any contractions or discomfort.  She was able to sleep overnight.  Pt is anxious to go home.    Objective     Temp:  [98.1  F (36.7  C)-98.4  F (36.9  C)] 98.1  F (36.7  C)  Heart Rate:  [] 75  Resp:  [20] 20  BP: (101-122)/(61-75) 101/61  General:  Alert, no acute distress  Abd: gravid, not tressa  Extr: no edema    FHT's  Baseline 140's with minimal variability, intermittent small variable decelerations.  BPP / 8/8, normal cord dopplers.      Principal Problem:    Non-reassuring fetal heart tones complicating pregnancy, antepartum  Active Problems:    Bipolar disorder    Abnormal  test    Pregnant and not yet delivered in third trimester    Non-reassuring fetal heart rate or rhythm affecting mother

## 2021-06-12 NOTE — DISCHARGE SUMMARY
DATE OF SERVICE: 08/19/2017    DATE OF ADMISSION:  08/18/2017    DATE OF DISCHARGE:  08/19/2017    ADMITTING DIAGNOSES:  1.  Intrauterine pregnancy, 34-1/2 weeks.  2.  Intrauterine growth restriction with nonreactive nonstress test.    HOSPITAL COURSE:  Jazz was observed.  An OCT was negative.  Ultrasound showed a BPP  of 8/8.  JAYCE went from 7.2 to over 8.  Doppler studies showed a resistance of 3.3.   The infant appeared to be doing well.  She was discharged to home to return on  Tuesday, 08/22/2017.      LINDA WHITTEN 08/19/2017 18:04:51  T 08/19/2017 21:53:52  R 08/20/2017 07:51:02  [id number]        cc:LINDA MATHIS MD

## 2021-06-12 NOTE — PROGRESS NOTES
In House OB Note    I was called to Room 2 to go over her BPP results.  We discussed that the BPP was 8/8 with a normal JAYCE of 7.2 cm.  The Dopplers were 3.3.  I counseled her that it is fine for her to take her Zyprexa and get some sleep.  We discussed that more than likely she will have another BPP during the day before any consideration for discharge would happen.  Questions were answered.    Sarika Paz MD

## 2021-06-12 NOTE — PROGRESS NOTES
Spoke with Dr Maddox and notified her of minimal FHR variability with no accels and a prolonged decel.  Pt turned to left side and juice and water given to drink po.  She is not having any contractions.  Dr Maddox ordered in house OB consult.

## 2021-09-03 PROCEDURE — U0003 INFECTIOUS AGENT DETECTION BY NUCLEIC ACID (DNA OR RNA); SEVERE ACUTE RESPIRATORY SYNDROME CORONAVIRUS 2 (SARS-COV-2) (CORONAVIRUS DISEASE [COVID-19]), AMPLIFIED PROBE TECHNIQUE, MAKING USE OF HIGH THROUGHPUT TECHNOLOGIES AS DESCRIBED BY CMS-2020-01-R: HCPCS | Mod: ORL | Performed by: FAMILY MEDICINE

## 2021-09-04 ENCOUNTER — LAB REQUISITION (OUTPATIENT)
Dept: LAB | Facility: CLINIC | Age: 31
End: 2021-09-04
Payer: MEDICARE

## 2021-09-04 DIAGNOSIS — Z03.818 ENCOUNTER FOR OBSERVATION FOR SUSPECTED EXPOSURE TO OTHER BIOLOGICAL AGENTS RULED OUT: ICD-10-CM

## 2021-09-05 LAB — SARS-COV-2 RNA RESP QL NAA+PROBE: NEGATIVE

## 2021-10-04 ENCOUNTER — LAB REQUISITION (OUTPATIENT)
Dept: LAB | Facility: CLINIC | Age: 31
End: 2021-10-04
Payer: MEDICARE

## 2021-10-04 DIAGNOSIS — Z03.818 ENCOUNTER FOR OBSERVATION FOR SUSPECTED EXPOSURE TO OTHER BIOLOGICAL AGENTS RULED OUT: ICD-10-CM

## 2021-10-04 PROCEDURE — U0003 INFECTIOUS AGENT DETECTION BY NUCLEIC ACID (DNA OR RNA); SEVERE ACUTE RESPIRATORY SYNDROME CORONAVIRUS 2 (SARS-COV-2) (CORONAVIRUS DISEASE [COVID-19]), AMPLIFIED PROBE TECHNIQUE, MAKING USE OF HIGH THROUGHPUT TECHNOLOGIES AS DESCRIBED BY CMS-2020-01-R: HCPCS | Mod: ORL | Performed by: FAMILY MEDICINE

## 2021-10-06 LAB — SARS-COV-2 RNA RESP QL NAA+PROBE: NEGATIVE

## 2022-02-11 ENCOUNTER — LAB REQUISITION (OUTPATIENT)
Dept: LAB | Facility: CLINIC | Age: 32
End: 2022-02-11
Payer: MEDICARE

## 2022-02-11 DIAGNOSIS — Z51.81 ENCOUNTER FOR THERAPEUTIC DRUG LEVEL MONITORING: ICD-10-CM

## 2022-02-11 LAB
ALBUMIN SERPL-MCNC: 3.4 G/DL (ref 3.5–5)
ALP SERPL-CCNC: 66 U/L (ref 45–120)
ALT SERPL W P-5'-P-CCNC: 10 U/L (ref 0–45)
ANION GAP SERPL CALCULATED.3IONS-SCNC: 9 MMOL/L (ref 5–18)
AST SERPL W P-5'-P-CCNC: 12 U/L (ref 0–40)
BILIRUB SERPL-MCNC: 0.2 MG/DL (ref 0–1)
BUN SERPL-MCNC: 13 MG/DL (ref 8–22)
CALCIUM SERPL-MCNC: 9 MG/DL (ref 8.5–10.5)
CHLORIDE BLD-SCNC: 106 MMOL/L (ref 98–107)
CHOLEST SERPL-MCNC: 186 MG/DL
CO2 SERPL-SCNC: 23 MMOL/L (ref 22–31)
CREAT SERPL-MCNC: 0.79 MG/DL (ref 0.6–1.1)
FASTING STATUS PATIENT QL REPORTED: NORMAL
GFR SERPL CREATININE-BSD FRML MDRD: >90 ML/MIN/1.73M2
GLUCOSE BLD-MCNC: 99 MG/DL (ref 70–125)
HDLC SERPL-MCNC: 54 MG/DL
LDLC SERPL CALC-MCNC: 111 MG/DL
POTASSIUM BLD-SCNC: 3.9 MMOL/L (ref 3.5–5)
PROT SERPL-MCNC: 6.6 G/DL (ref 6–8)
SODIUM SERPL-SCNC: 138 MMOL/L (ref 136–145)
TRIGL SERPL-MCNC: 107 MG/DL

## 2022-02-11 PROCEDURE — 80061 LIPID PANEL: CPT | Mod: ORL | Performed by: FAMILY MEDICINE

## 2022-02-11 PROCEDURE — 80053 COMPREHEN METABOLIC PANEL: CPT | Mod: ORL | Performed by: FAMILY MEDICINE

## 2022-06-22 ENCOUNTER — LAB REQUISITION (OUTPATIENT)
Dept: LAB | Facility: CLINIC | Age: 32
End: 2022-06-22
Payer: MEDICARE

## 2022-06-22 DIAGNOSIS — Z01.419 ENCOUNTER FOR GYNECOLOGICAL EXAMINATION (GENERAL) (ROUTINE) WITHOUT ABNORMAL FINDINGS: ICD-10-CM

## 2022-06-22 PROCEDURE — G0145 SCR C/V CYTO,THINLAYER,RESCR: HCPCS | Mod: ORL | Performed by: FAMILY MEDICINE

## 2022-06-22 PROCEDURE — 87624 HPV HI-RISK TYP POOLED RSLT: CPT | Mod: ORL | Performed by: FAMILY MEDICINE

## 2022-06-27 LAB
BKR LAB AP GYN ADEQUACY: NORMAL
BKR LAB AP GYN INTERPRETATION: NORMAL
BKR LAB AP HPV REFLEX: NORMAL
BKR LAB AP LMP: NORMAL
BKR LAB AP PREVIOUS ABNORMAL: NORMAL
PATH REPORT.COMMENTS IMP SPEC: NORMAL
PATH REPORT.COMMENTS IMP SPEC: NORMAL
PATH REPORT.RELEVANT HX SPEC: NORMAL

## 2022-06-29 LAB
HUMAN PAPILLOMA VIRUS 16 DNA: NEGATIVE
HUMAN PAPILLOMA VIRUS 18 DNA: NEGATIVE
HUMAN PAPILLOMA VIRUS FINAL DIAGNOSIS: NORMAL
HUMAN PAPILLOMA VIRUS OTHER HR: NEGATIVE

## 2022-09-02 ENCOUNTER — LAB REQUISITION (OUTPATIENT)
Dept: LAB | Facility: CLINIC | Age: 32
End: 2022-09-02
Payer: MEDICARE

## 2022-09-02 DIAGNOSIS — N91.2 AMENORRHEA, UNSPECIFIED: ICD-10-CM

## 2022-09-02 LAB — HCG SERPL QL: NEGATIVE

## 2022-09-02 PROCEDURE — 84703 CHORIONIC GONADOTROPIN ASSAY: CPT | Mod: ORL | Performed by: PHYSICIAN ASSISTANT

## 2022-09-28 ENCOUNTER — LAB REQUISITION (OUTPATIENT)
Dept: LAB | Facility: CLINIC | Age: 32
End: 2022-09-28
Payer: MEDICARE

## 2022-09-28 DIAGNOSIS — N91.1 SECONDARY AMENORRHEA: ICD-10-CM

## 2022-09-28 LAB
HCG SERPL QL: NEGATIVE
TSH SERPL DL<=0.005 MIU/L-ACNC: 1.51 UIU/ML (ref 0.3–4.2)

## 2022-09-28 PROCEDURE — 84703 CHORIONIC GONADOTROPIN ASSAY: CPT | Mod: ORL | Performed by: FAMILY MEDICINE

## 2022-09-28 PROCEDURE — 84443 ASSAY THYROID STIM HORMONE: CPT | Mod: ORL | Performed by: FAMILY MEDICINE

## 2023-03-08 ENCOUNTER — LAB REQUISITION (OUTPATIENT)
Dept: LAB | Facility: CLINIC | Age: 33
End: 2023-03-08
Payer: MEDICARE

## 2023-03-08 DIAGNOSIS — R11.0 NAUSEA: ICD-10-CM

## 2023-03-08 PROCEDURE — 80053 COMPREHEN METABOLIC PANEL: CPT | Mod: ORL | Performed by: FAMILY MEDICINE

## 2023-03-09 LAB
ALBUMIN SERPL BCG-MCNC: 3.8 G/DL (ref 3.5–5.2)
ALP SERPL-CCNC: 62 U/L (ref 35–104)
ALT SERPL W P-5'-P-CCNC: 10 U/L (ref 10–35)
ANION GAP SERPL CALCULATED.3IONS-SCNC: 12 MMOL/L (ref 7–15)
AST SERPL W P-5'-P-CCNC: 12 U/L (ref 10–35)
BILIRUB SERPL-MCNC: <0.2 MG/DL
BUN SERPL-MCNC: 9.6 MG/DL (ref 6–20)
CALCIUM SERPL-MCNC: 9.1 MG/DL (ref 8.6–10)
CHLORIDE SERPL-SCNC: 106 MMOL/L (ref 98–107)
CREAT SERPL-MCNC: 0.78 MG/DL (ref 0.51–0.95)
DEPRECATED HCO3 PLAS-SCNC: 22 MMOL/L (ref 22–29)
GFR SERPL CREATININE-BSD FRML MDRD: >90 ML/MIN/1.73M2
GLUCOSE SERPL-MCNC: 86 MG/DL (ref 70–99)
POTASSIUM SERPL-SCNC: 4.1 MMOL/L (ref 3.4–5.3)
PROT SERPL-MCNC: 6.2 G/DL (ref 6.4–8.3)
SODIUM SERPL-SCNC: 140 MMOL/L (ref 136–145)

## 2023-08-04 ENCOUNTER — LAB REQUISITION (OUTPATIENT)
Dept: LAB | Facility: CLINIC | Age: 33
End: 2023-08-04
Payer: MEDICARE

## 2023-08-04 DIAGNOSIS — Z51.81 ENCOUNTER FOR THERAPEUTIC DRUG LEVEL MONITORING: ICD-10-CM

## 2023-08-04 LAB
CHOLEST SERPL-MCNC: 190 MG/DL
HDLC SERPL-MCNC: 51 MG/DL
LDLC SERPL CALC-MCNC: 110 MG/DL
NONHDLC SERPL-MCNC: 139 MG/DL
TRIGL SERPL-MCNC: 143 MG/DL

## 2023-08-04 PROCEDURE — 80061 LIPID PANEL: CPT | Mod: ORL | Performed by: FAMILY MEDICINE

## 2023-12-12 ENCOUNTER — LAB REQUISITION (OUTPATIENT)
Dept: LAB | Facility: CLINIC | Age: 33
End: 2023-12-12
Payer: MEDICARE

## 2023-12-12 DIAGNOSIS — R79.89 OTHER SPECIFIED ABNORMAL FINDINGS OF BLOOD CHEMISTRY: ICD-10-CM

## 2023-12-12 DIAGNOSIS — N91.2 AMENORRHEA, UNSPECIFIED: ICD-10-CM

## 2023-12-12 PROCEDURE — 80061 LIPID PANEL: CPT | Mod: ORL | Performed by: STUDENT IN AN ORGANIZED HEALTH CARE EDUCATION/TRAINING PROGRAM

## 2023-12-12 PROCEDURE — 80053 COMPREHEN METABOLIC PANEL: CPT | Mod: ORL | Performed by: STUDENT IN AN ORGANIZED HEALTH CARE EDUCATION/TRAINING PROGRAM

## 2023-12-12 PROCEDURE — 84443 ASSAY THYROID STIM HORMONE: CPT | Mod: ORL | Performed by: STUDENT IN AN ORGANIZED HEALTH CARE EDUCATION/TRAINING PROGRAM

## 2023-12-13 LAB
ALBUMIN SERPL BCG-MCNC: 3.8 G/DL (ref 3.5–5.2)
ALP SERPL-CCNC: 57 U/L (ref 40–150)
ALT SERPL W P-5'-P-CCNC: 12 U/L (ref 0–50)
ANION GAP SERPL CALCULATED.3IONS-SCNC: 9 MMOL/L (ref 7–15)
AST SERPL W P-5'-P-CCNC: 13 U/L (ref 0–45)
BILIRUB SERPL-MCNC: <0.2 MG/DL
BUN SERPL-MCNC: 8.2 MG/DL (ref 6–20)
CALCIUM SERPL-MCNC: 8.6 MG/DL (ref 8.6–10)
CHLORIDE SERPL-SCNC: 104 MMOL/L (ref 98–107)
CHOLEST SERPL-MCNC: 163 MG/DL
CREAT SERPL-MCNC: 0.77 MG/DL (ref 0.51–0.95)
DEPRECATED HCO3 PLAS-SCNC: 24 MMOL/L (ref 22–29)
EGFRCR SERPLBLD CKD-EPI 2021: >90 ML/MIN/1.73M2
FASTING STATUS PATIENT QL REPORTED: ABNORMAL
GLUCOSE SERPL-MCNC: 93 MG/DL (ref 70–99)
HDLC SERPL-MCNC: 55 MG/DL
LDLC SERPL CALC-MCNC: 77 MG/DL
NONHDLC SERPL-MCNC: 108 MG/DL
POTASSIUM SERPL-SCNC: 3.9 MMOL/L (ref 3.4–5.3)
PROT SERPL-MCNC: 6.3 G/DL (ref 6.4–8.3)
SODIUM SERPL-SCNC: 137 MMOL/L (ref 135–145)
TRIGL SERPL-MCNC: 156 MG/DL
TSH SERPL DL<=0.005 MIU/L-ACNC: 1.41 UIU/ML (ref 0.3–4.2)

## 2024-06-04 ENCOUNTER — LAB REQUISITION (OUTPATIENT)
Dept: LAB | Facility: CLINIC | Age: 34
End: 2024-06-04
Payer: MEDICARE

## 2024-06-04 DIAGNOSIS — Z32.00 ENCOUNTER FOR PREGNANCY TEST, RESULT UNKNOWN: ICD-10-CM

## 2024-06-04 PROCEDURE — 84703 CHORIONIC GONADOTROPIN ASSAY: CPT | Mod: ORL | Performed by: FAMILY MEDICINE

## 2024-06-05 LAB — HCG SERPL QL: NEGATIVE

## 2024-08-07 ENCOUNTER — LAB REQUISITION (OUTPATIENT)
Dept: LAB | Facility: CLINIC | Age: 34
End: 2024-08-07
Payer: MEDICARE

## 2024-08-07 DIAGNOSIS — Z51.81 ENCOUNTER FOR THERAPEUTIC DRUG LEVEL MONITORING: ICD-10-CM

## 2024-08-07 LAB
ALBUMIN SERPL BCG-MCNC: 3.7 G/DL (ref 3.5–5.2)
ALP SERPL-CCNC: 75 U/L (ref 40–150)
ALT SERPL W P-5'-P-CCNC: 24 U/L (ref 0–50)
ANION GAP SERPL CALCULATED.3IONS-SCNC: 10 MMOL/L (ref 7–15)
AST SERPL W P-5'-P-CCNC: 21 U/L (ref 0–45)
BILIRUB SERPL-MCNC: <0.2 MG/DL
BUN SERPL-MCNC: 10.7 MG/DL (ref 6–20)
CALCIUM SERPL-MCNC: 9.1 MG/DL (ref 8.8–10.4)
CHLORIDE SERPL-SCNC: 104 MMOL/L (ref 98–107)
CREAT SERPL-MCNC: 0.86 MG/DL (ref 0.51–0.95)
EGFRCR SERPLBLD CKD-EPI 2021: 90 ML/MIN/1.73M2
GLUCOSE SERPL-MCNC: 85 MG/DL (ref 70–99)
HCO3 SERPL-SCNC: 24 MMOL/L (ref 22–29)
POTASSIUM SERPL-SCNC: 4 MMOL/L (ref 3.4–5.3)
PROT SERPL-MCNC: 6.3 G/DL (ref 6.4–8.3)
SODIUM SERPL-SCNC: 138 MMOL/L (ref 135–145)

## 2024-08-07 PROCEDURE — 80053 COMPREHEN METABOLIC PANEL: CPT | Mod: ORL | Performed by: FAMILY MEDICINE

## (undated) DEVICE — PACK C-SECTION LF PL15OTA83B

## (undated) DEVICE — ESU GROUND PAD UNIVERSAL W/O CORD

## (undated) DEVICE — DRAPE SETUP C-SECTION INVISISHIELD 54X90" DYNJE5600

## (undated) DEVICE — SUCTION CANISTER MEDIVAC LINER 1500ML W/LID 65651-515

## (undated) DEVICE — BASIN SET MAJOR

## (undated) DEVICE — STOCKING SLEEVE COMPRESSION CALF LG

## (undated) DEVICE — GLOVE ESTEEM POWDER FREE SMT 6.0  2D72PT60

## (undated) DEVICE — SOL NACL 0.9% IRRIG 1000ML BOTTLE 07138-09

## (undated) DEVICE — SOL WATER IRRIG 1000ML BOTTLE 07139-09

## (undated) DEVICE — DRSG STERI STRIP 1/2X4" R1547

## (undated) DEVICE — CATH TRAY FOLEY 16FR SILICONE 907416

## (undated) DEVICE — PREP CHLORAPREP 26ML TINTED ORANGE  260815

## (undated) DEVICE — GLOVE PROTEXIS POWDER FREE SMT 6.0  2D72PT60X

## (undated) DEVICE — SU VICRYL 0 CT-1 36" J346H

## (undated) DEVICE — STRAP KNEE/BODY 31143004

## (undated) RX ORDER — ONDANSETRON 2 MG/ML
INJECTION INTRAMUSCULAR; INTRAVENOUS
Status: DISPENSED
Start: 2017-08-23

## (undated) RX ORDER — MORPHINE SULFATE 1 MG/ML
INJECTION, SOLUTION EPIDURAL; INTRATHECAL; INTRAVENOUS
Status: DISPENSED
Start: 2017-08-23

## (undated) RX ORDER — PHENYLEPHRINE HCL IN 0.9% NACL 1 MG/10 ML
SYRINGE (ML) INTRAVENOUS
Status: DISPENSED
Start: 2017-08-23

## (undated) RX ORDER — KETOROLAC TROMETHAMINE 30 MG/ML
INJECTION, SOLUTION INTRAMUSCULAR; INTRAVENOUS
Status: DISPENSED
Start: 2017-08-23